# Patient Record
Sex: MALE | Race: WHITE | NOT HISPANIC OR LATINO | Employment: FULL TIME | URBAN - METROPOLITAN AREA
[De-identification: names, ages, dates, MRNs, and addresses within clinical notes are randomized per-mention and may not be internally consistent; named-entity substitution may affect disease eponyms.]

---

## 2023-06-14 ENCOUNTER — APPOINTMENT (OUTPATIENT)
Dept: PRIMARY CARE | Facility: CLINIC | Age: 48
End: 2023-06-14
Payer: COMMERCIAL

## 2023-06-28 ENCOUNTER — OFFICE VISIT (OUTPATIENT)
Dept: PRIMARY CARE | Facility: CLINIC | Age: 48
End: 2023-06-28
Payer: COMMERCIAL

## 2023-06-28 VITALS
RESPIRATION RATE: 18 BRPM | BODY MASS INDEX: 42.7 KG/M2 | HEIGHT: 71 IN | HEART RATE: 76 BPM | SYSTOLIC BLOOD PRESSURE: 138 MMHG | WEIGHT: 305 LBS | TEMPERATURE: 98.4 F | DIASTOLIC BLOOD PRESSURE: 90 MMHG

## 2023-06-28 DIAGNOSIS — I82.402 ACUTE THROMBOEMBOLISM OF DEEP VEINS OF LEFT LOWER EXTREMITY (MULTI): Primary | ICD-10-CM

## 2023-06-28 DIAGNOSIS — I10 BENIGN ESSENTIAL HTN: ICD-10-CM

## 2023-06-28 DIAGNOSIS — E66.01 OBESITY, CLASS III, BMI 40-49.9 (MORBID OBESITY) (MULTI): ICD-10-CM

## 2023-06-28 PROBLEM — E66.813 OBESITY, CLASS III, BMI 40-49.9 (MORBID OBESITY): Status: ACTIVE | Noted: 2023-06-28

## 2023-06-28 PROBLEM — I83.819 VARICOSE VEINS WITH PAIN: Status: ACTIVE | Noted: 2023-06-28

## 2023-06-28 PROCEDURE — 3075F SYST BP GE 130 - 139MM HG: CPT | Performed by: FAMILY MEDICINE

## 2023-06-28 PROCEDURE — 4004F PT TOBACCO SCREEN RCVD TLK: CPT | Performed by: FAMILY MEDICINE

## 2023-06-28 PROCEDURE — 99214 OFFICE O/P EST MOD 30 MIN: CPT | Performed by: FAMILY MEDICINE

## 2023-06-28 PROCEDURE — 3080F DIAST BP >= 90 MM HG: CPT | Performed by: FAMILY MEDICINE

## 2023-06-28 RX ORDER — APIXABAN 5 MG/1
5 TABLET, FILM COATED ORAL 2 TIMES DAILY
COMMUNITY
End: 2023-06-28 | Stop reason: SDUPTHER

## 2023-06-28 RX ORDER — LISINOPRIL AND HYDROCHLOROTHIAZIDE 12.5; 2 MG/1; MG/1
1 TABLET ORAL DAILY
COMMUNITY
Start: 2022-11-09 | End: 2023-06-28 | Stop reason: SDUPTHER

## 2023-06-28 RX ORDER — LISINOPRIL AND HYDROCHLOROTHIAZIDE 12.5; 2 MG/1; MG/1
1 TABLET ORAL DAILY
Qty: 90 TABLET | Refills: 3 | Status: SHIPPED | OUTPATIENT
Start: 2023-06-28 | End: 2024-06-27

## 2023-06-28 RX ORDER — APIXABAN 5 MG/1
5 TABLET, FILM COATED ORAL 2 TIMES DAILY
Qty: 180 TABLET | Refills: 3 | Status: SHIPPED | OUTPATIENT
Start: 2023-06-28 | End: 2024-06-27

## 2023-06-28 RX ORDER — SEMAGLUTIDE 0.25 MG/.5ML
0.25 INJECTION, SOLUTION SUBCUTANEOUS
Qty: 0.5 ML | Refills: 1 | Status: SHIPPED | OUTPATIENT
Start: 2023-06-28 | End: 2023-07-19

## 2023-06-28 ASSESSMENT — ENCOUNTER SYMPTOMS
SHORTNESS OF BREATH: 0
HEADACHES: 0
BLURRED VISION: 0
PALPITATIONS: 0
HYPERTENSION: 1

## 2023-06-28 NOTE — ASSESSMENT & PLAN NOTE
Obesity has been something he has struggled with for many years now.  He has been unable to make progress with diet and exercise and is actually gained weight over the years.  We discussed treatment options and I think that he would benefit greatly from weight loss help with his hypertension among other things.  We discussed the pros and cons of starting a medicine like Wegovy and he would like to proceed with this.  I will refer him to our in-house pharmacist to help with the titration and start the process.  He will follow-up in 3 months   coagulation(Bleeding disorder R/T clinical cond/anti-coags)

## 2023-06-28 NOTE — ASSESSMENT & PLAN NOTE
He reports good compliance with his lisinopril HCT medication.  Blood pressure reduced from a systolic of 149-138 after sitting quietly for a few moments in my office.  I do not think he needs an adjustment on his blood pressure medicine today.  We will continue the current treatment and follow-up in 3 months

## 2023-06-28 NOTE — PROGRESS NOTES
Subjective   Tien Prakash is a 48 y.o. male who presents for Hypertension.  Hypertension  This is a chronic problem. The problem is controlled. Pertinent negatives include no blurred vision, chest pain, headaches, palpitations, peripheral edema or shortness of breath. Past treatments include ACE inhibitors and diuretics. The current treatment provides significant improvement.     Visit Vitals  /90   Pulse 76   Temp 36.9 °C (98.4 °F)   Resp 18      Objective   Physical Exam  Constitutional:       Appearance: Normal appearance.   HENT:      Head: Normocephalic.   Pulmonary:      Effort: Pulmonary effort is normal.   Musculoskeletal:      Cervical back: Neck supple.      Right lower leg: No edema.      Left lower leg: No edema.   Skin:     General: Skin is warm and dry.   Psychiatric:         Mood and Affect: Mood normal.         Assessment/Plan    Problem List Items Addressed This Visit       Obesity, Class III, BMI 40-49.9 (morbid obesity) (CMS/HCC)     Obesity has been something he has struggled with for many years now.  He has been unable to make progress with diet and exercise and is actually gained weight over the years.  We discussed treatment options and I think that he would benefit greatly from weight loss help with his hypertension among other things.  We discussed the pros and cons of starting a medicine like Wegovy and he would like to proceed with this.  I will refer him to our in-house pharmacist to help with the titration and start the process.  He will follow-up in 3 months         Relevant Medications    semaglutide, weight loss, (Wegovy) 0.25 mg/0.5 mL pen injector    Other Relevant Orders    Follow Up In [facility] - Pharmacy    Follow Up In [facility] - PCP - Established    Benign essential HTN     He reports good compliance with his lisinopril HCT medication.  Blood pressure reduced from a systolic of 149-138 after sitting quietly for a few moments in my office.  I do  not think he needs an adjustment on his blood pressure medicine today.  We will continue the current treatment and follow-up in 3 months         Relevant Medications    lisinopriL-hydrochlorothiazide 20-12.5 mg tablet    Acute thromboembolism of deep veins of left lower extremity (CMS/HCC) - Primary     He will maintain long term anticoagulation with eliquis due to a large unprovoked DVT         Relevant Medications    Eliquis 5 mg tablet

## 2023-07-10 ENCOUNTER — APPOINTMENT (OUTPATIENT)
Dept: PHARMACY | Facility: HOSPITAL | Age: 48
End: 2023-07-10
Payer: COMMERCIAL

## 2023-07-18 ENCOUNTER — APPOINTMENT (OUTPATIENT)
Dept: PHARMACY | Facility: HOSPITAL | Age: 48
End: 2023-07-18
Payer: COMMERCIAL

## 2023-07-19 ENCOUNTER — TELEMEDICINE (OUTPATIENT)
Dept: PHARMACY | Facility: HOSPITAL | Age: 48
End: 2023-07-19
Payer: COMMERCIAL

## 2023-07-19 DIAGNOSIS — E66.01 OBESITY, CLASS III, BMI 40-49.9 (MORBID OBESITY) (MULTI): ICD-10-CM

## 2023-07-19 RX ORDER — SEMAGLUTIDE 0.68 MG/ML
0.25 INJECTION, SOLUTION SUBCUTANEOUS
Qty: 3 ML | Refills: 1 | Status: SHIPPED | OUTPATIENT
Start: 2023-07-19 | End: 2023-08-10

## 2023-07-19 NOTE — PROGRESS NOTES
"Subjective   Patient ID: Tien Prakash is a 48 y.o. male who presents for Obesity (Referred by PCP). Wegovy plan exclusion. Received test claim data from  pharmacy and updated pt. Discussed options available and discussed that each option will need a PA and may be denied d/t diagnosis of obesity.      There were no vitals taken for this visit.        Assessment/Plan   Problem List Items Addressed This Visit       Obesity, Class III, BMI 40-49.9 (morbid obesity) (CMS/[facility])    Relevant Medications    semaglutide (Ozempic) 0.25 mg or 0.5 mg (2 mg/3 mL) pen injector    Other Relevant Orders    Follow Up In [facility] - Pharmacy       LAB RESULTS:  No results found for: \"HGBA1C\"  Lab Results   Component Value Date    CREATININE 1.13 09/08/2022      Lab Results   Component Value Date    CHOL 203 (H) 09/08/2022     Lab Results   Component Value Date    HDL 39.0 (A) 09/08/2022       No results found for: \"LDLCALC\"  Lab Results   Component Value Date    TRIG 123 09/08/2022     Continue all meds under the continuation of care with the referring provider and clinical pharmacy team.    Rx sent for Ozempic to [facility] for PA assistance; to follow-up with pt in 3 weeks to update.     "

## 2023-08-09 ENCOUNTER — TELEMEDICINE (OUTPATIENT)
Dept: PHARMACY | Facility: HOSPITAL | Age: 48
End: 2023-08-09
Payer: COMMERCIAL

## 2023-08-09 DIAGNOSIS — E66.01 OBESITY, CLASS III, BMI 40-49.9 (MORBID OBESITY) (MULTI): ICD-10-CM

## 2023-08-09 NOTE — PROGRESS NOTES
"Subjective   Patient ID: Tien Prakash is a 48 y.o. male who presents for Obesity (Referred by PCP. ). Confrimed with [facility] that all obesity related GLP-1RA's are not covered under patient's plan. Ozempic for example needs a DMII diagnosis to be considered for coverage. Pt reports he may be joining a weight loss clinic that supplies semaglutide injections without a Rx in the near future       There were no vitals taken for this visit.        Assessment/Plan   Problem List Items Addressed This Visit       Obesity, Class III, BMI 40-49.9 (morbid obesity) (CMS/[facility])       LAB RESULTS:  No results found for: \"HGBA1C\"  Lab Results   Component Value Date    CREATININE 1.13 09/08/2022      Lab Results   Component Value Date    CHOL 203 (H) 09/08/2022     Lab Results   Component Value Date    HDL 39.0 (A) 09/08/2022       No results found for: \"LDLCALC\"  Lab Results   Component Value Date    TRIG 123 09/08/2022       Continue all meds under the continuation of care with the referring provider and clinical pharmacy team.    -Closing referral as no GLP-1RA's are covered for obesity under ins. Plan.         "

## 2023-08-09 NOTE — Clinical Note
Pt. Mentioned joining a weight loss clinic that supplies semaglutide injections (through syringe). Unable to manage this but wanted you to be aware as pt may be losing weight without otherwise known cause.

## 2023-09-29 ENCOUNTER — LAB (OUTPATIENT)
Dept: LAB | Facility: LAB | Age: 48
End: 2023-09-29
Payer: COMMERCIAL

## 2023-09-29 ENCOUNTER — OFFICE VISIT (OUTPATIENT)
Dept: PRIMARY CARE | Facility: CLINIC | Age: 48
End: 2023-09-29
Payer: COMMERCIAL

## 2023-09-29 VITALS
HEIGHT: 71 IN | WEIGHT: 292 LBS | HEART RATE: 86 BPM | OXYGEN SATURATION: 95 % | BODY MASS INDEX: 40.88 KG/M2 | RESPIRATION RATE: 16 BRPM | DIASTOLIC BLOOD PRESSURE: 86 MMHG | TEMPERATURE: 96.1 F | SYSTOLIC BLOOD PRESSURE: 125 MMHG

## 2023-09-29 DIAGNOSIS — Z00.00 ROUTINE GENERAL MEDICAL EXAMINATION AT A HEALTH CARE FACILITY: ICD-10-CM

## 2023-09-29 DIAGNOSIS — Z23 NEED FOR INFLUENZA VACCINATION: ICD-10-CM

## 2023-09-29 DIAGNOSIS — I10 BENIGN ESSENTIAL HTN: Primary | ICD-10-CM

## 2023-09-29 DIAGNOSIS — E66.01 OBESITY, CLASS III, BMI 40-49.9 (MORBID OBESITY) (MULTI): ICD-10-CM

## 2023-09-29 DIAGNOSIS — I82.402 ACUTE THROMBOEMBOLISM OF DEEP VEINS OF LEFT LOWER EXTREMITY (MULTI): ICD-10-CM

## 2023-09-29 DIAGNOSIS — I10 BENIGN ESSENTIAL HTN: ICD-10-CM

## 2023-09-29 LAB
ALANINE AMINOTRANSFERASE (SGPT) (U/L) IN SER/PLAS: 26 U/L (ref 10–52)
ALBUMIN (G/DL) IN SER/PLAS: 4.4 G/DL (ref 3.4–5)
ALKALINE PHOSPHATASE (U/L) IN SER/PLAS: 63 U/L (ref 33–120)
ANION GAP IN SER/PLAS: 11 MMOL/L (ref 10–20)
ASPARTATE AMINOTRANSFERASE (SGOT) (U/L) IN SER/PLAS: 15 U/L (ref 9–39)
BILIRUBIN TOTAL (MG/DL) IN SER/PLAS: 0.6 MG/DL (ref 0–1.2)
CALCIUM (MG/DL) IN SER/PLAS: 9.4 MG/DL (ref 8.6–10.3)
CARBON DIOXIDE, TOTAL (MMOL/L) IN SER/PLAS: 28 MMOL/L (ref 21–32)
CHLORIDE (MMOL/L) IN SER/PLAS: 103 MMOL/L (ref 98–107)
CHOLESTEROL (MG/DL) IN SER/PLAS: 177 MG/DL (ref 0–199)
CHOLESTEROL IN HDL (MG/DL) IN SER/PLAS: 37.8 MG/DL
CHOLESTEROL/HDL RATIO: 4.7
CREATININE (MG/DL) IN SER/PLAS: 1.16 MG/DL (ref 0.5–1.3)
ERYTHROCYTE DISTRIBUTION WIDTH (RATIO) BY AUTOMATED COUNT: 13.2 % (ref 11.5–14.5)
ERYTHROCYTE MEAN CORPUSCULAR HEMOGLOBIN CONCENTRATION (G/DL) BY AUTOMATED: 31.7 G/DL (ref 32–36)
ERYTHROCYTE MEAN CORPUSCULAR VOLUME (FL) BY AUTOMATED COUNT: 91 FL (ref 80–100)
ERYTHROCYTES (10*6/UL) IN BLOOD BY AUTOMATED COUNT: 5.95 X10E12/L (ref 4.5–5.9)
GFR MALE: 77 ML/MIN/1.73M2
GLUCOSE (MG/DL) IN SER/PLAS: 83 MG/DL (ref 74–99)
HEMATOCRIT (%) IN BLOOD BY AUTOMATED COUNT: 54 % (ref 41–52)
HEMOGLOBIN (G/DL) IN BLOOD: 17.1 G/DL (ref 13.5–17.5)
HEPATITIS C VIRUS AB PRESENCE IN SERUM: NONREACTIVE
LDL: 119 MG/DL (ref 0–99)
LEUKOCYTES (10*3/UL) IN BLOOD BY AUTOMATED COUNT: 11 X10E9/L (ref 4.4–11.3)
PLATELETS (10*3/UL) IN BLOOD AUTOMATED COUNT: 297 X10E9/L (ref 150–450)
POTASSIUM (MMOL/L) IN SER/PLAS: 4.4 MMOL/L (ref 3.5–5.3)
PROTEIN TOTAL: 7.1 G/DL (ref 6.4–8.2)
SODIUM (MMOL/L) IN SER/PLAS: 138 MMOL/L (ref 136–145)
TRIGLYCERIDE (MG/DL) IN SER/PLAS: 99 MG/DL (ref 0–149)
UREA NITROGEN (MG/DL) IN SER/PLAS: 19 MG/DL (ref 6–23)
VLDL: 20 MG/DL (ref 0–40)

## 2023-09-29 PROCEDURE — 80061 LIPID PANEL: CPT

## 2023-09-29 PROCEDURE — 90471 IMMUNIZATION ADMIN: CPT | Performed by: FAMILY MEDICINE

## 2023-09-29 PROCEDURE — 86803 HEPATITIS C AB TEST: CPT

## 2023-09-29 PROCEDURE — 99214 OFFICE O/P EST MOD 30 MIN: CPT | Performed by: FAMILY MEDICINE

## 2023-09-29 PROCEDURE — 3074F SYST BP LT 130 MM HG: CPT | Performed by: FAMILY MEDICINE

## 2023-09-29 PROCEDURE — 85027 COMPLETE CBC AUTOMATED: CPT

## 2023-09-29 PROCEDURE — 80053 COMPREHEN METABOLIC PANEL: CPT

## 2023-09-29 PROCEDURE — 90686 IIV4 VACC NO PRSV 0.5 ML IM: CPT | Performed by: FAMILY MEDICINE

## 2023-09-29 PROCEDURE — 3079F DIAST BP 80-89 MM HG: CPT | Performed by: FAMILY MEDICINE

## 2023-09-29 PROCEDURE — 4004F PT TOBACCO SCREEN RCVD TLK: CPT | Performed by: FAMILY MEDICINE

## 2023-09-29 PROCEDURE — 36415 COLL VENOUS BLD VENIPUNCTURE: CPT

## 2023-09-29 ASSESSMENT — ENCOUNTER SYMPTOMS
PALPITATIONS: 0
HYPERTENSION: 1
BLURRED VISION: 0
HEADACHES: 0

## 2023-09-29 NOTE — PROGRESS NOTES
Subjective   Tien Prakash is a 48 y.o. male who presents for Hypertension and Weight Loss.    Hypertension  This is a chronic problem. The problem is controlled. Pertinent negatives include no blurred vision, chest pain, headaches or palpitations.     Visit Vitals  /86 (BP Location: Right arm, Patient Position: Sitting)   Pulse 86   Temp 35.6 °C (96.1 °F)   Resp 16      Objective   Physical Exam  Constitutional:       Appearance: Normal appearance.   HENT:      Head: Normocephalic.   Eyes:      Conjunctiva/sclera: Conjunctivae normal.   Cardiovascular:      Rate and Rhythm: Normal rate and regular rhythm.   Pulmonary:      Effort: Pulmonary effort is normal.      Breath sounds: Normal breath sounds.   Musculoskeletal:      Cervical back: Neck supple.   Skin:     General: Skin is warm and dry.   Neurological:      Mental Status: He is alert.         Assessment/Plan    Problem List Items Addressed This Visit       Obesity, Class III, BMI 40-49.9 (morbid obesity) (CMS/[facility])     Unfortunately he was unable to get insurance coverage for Wegovy.  He ultimately went to a weight loss center and was initiated on semaglutide which she was able to get for just $300 a month.  He has had a 10 pound weight loss already and is tolerating his titration well.  I encouraged him to continue with this treatment and we will get his annual routine labs.  Since his lipid panel was mildly elevated and his ASCVD risk score was 10.1% last year we will repeat the numbers this year in the hopes that this diet and weight loss have improved his score         Relevant Orders    CBC    Comprehensive Metabolic Panel    Lipid Panel    Follow Up In [facility] - PCP - Health Maintenance    Benign essential HTN - Primary     This remains under good control with a blood pressure systolic 125.  We will continue the lisinopril and thiazide diuretic and get annual blood work         Relevant Orders    CBC    Comprehensive Metabolic Panel     Lipid Panel    Acute thromboembolism of deep veins of left lower extremity (CMS/HCC)     He will maintain long term anticoagulation with eliquis due to a large unprovoked DVT          Other Visit Diagnoses       Need for influenza vaccination        Relevant Orders    Flu vaccine (IIV4) age 6 months and greater, preservative free (Completed)    Routine general medical examination at a health care facility        Relevant Orders    Hepatitis C Antibody

## 2023-09-29 NOTE — ASSESSMENT & PLAN NOTE
This remains under good control with a blood pressure systolic 125.  We will continue the lisinopril and thiazide diuretic and get annual blood work

## 2023-09-29 NOTE — ASSESSMENT & PLAN NOTE
Unfortunately he was unable to get insurance coverage for Wegovy.  He ultimately went to a weight loss center and was initiated on semaglutide which she was able to get for just $300 a month.  He has had a 10 pound weight loss already and is tolerating his titration well.  I encouraged him to continue with this treatment and we will get his annual routine labs.  Since his lipid panel was mildly elevated and his ASCVD risk score was 10.1% last year we will repeat the numbers this year in the hopes that this diet and weight loss have improved his score

## 2024-07-06 DIAGNOSIS — I10 BENIGN ESSENTIAL HTN: ICD-10-CM

## 2024-07-08 RX ORDER — LISINOPRIL AND HYDROCHLOROTHIAZIDE 12.5; 2 MG/1; MG/1
1 TABLET ORAL DAILY
Qty: 90 TABLET | Refills: 3 | Status: SHIPPED | OUTPATIENT
Start: 2024-07-08

## 2024-09-30 ENCOUNTER — APPOINTMENT (OUTPATIENT)
Dept: PRIMARY CARE | Facility: CLINIC | Age: 49
End: 2024-09-30
Payer: COMMERCIAL

## 2024-09-30 ENCOUNTER — LAB (OUTPATIENT)
Dept: LAB | Facility: LAB | Age: 49
End: 2024-09-30
Payer: COMMERCIAL

## 2024-09-30 VITALS
TEMPERATURE: 97.2 F | SYSTOLIC BLOOD PRESSURE: 131 MMHG | RESPIRATION RATE: 18 BRPM | WEIGHT: 235 LBS | DIASTOLIC BLOOD PRESSURE: 84 MMHG | HEART RATE: 78 BPM | HEIGHT: 71 IN | OXYGEN SATURATION: 98 % | BODY MASS INDEX: 32.9 KG/M2

## 2024-09-30 DIAGNOSIS — I10 BENIGN ESSENTIAL HTN: ICD-10-CM

## 2024-09-30 DIAGNOSIS — E66.811 CLASS 1 OBESITY DUE TO EXCESS CALORIES WITH SERIOUS COMORBIDITY AND BODY MASS INDEX (BMI) OF 32.0 TO 32.9 IN ADULT: ICD-10-CM

## 2024-09-30 DIAGNOSIS — Z00.00 ROUTINE GENERAL MEDICAL EXAMINATION AT A HEALTH CARE FACILITY: ICD-10-CM

## 2024-09-30 DIAGNOSIS — Z11.4 ENCOUNTER FOR SCREENING FOR HIV: ICD-10-CM

## 2024-09-30 DIAGNOSIS — Z23 NEED FOR INFLUENZA VACCINATION: ICD-10-CM

## 2024-09-30 DIAGNOSIS — L98.9 LESION OF SKIN OF NOSE: ICD-10-CM

## 2024-09-30 DIAGNOSIS — Z00.00 ROUTINE GENERAL MEDICAL EXAMINATION AT A HEALTH CARE FACILITY: Primary | ICD-10-CM

## 2024-09-30 DIAGNOSIS — E66.09 CLASS 1 OBESITY DUE TO EXCESS CALORIES WITH SERIOUS COMORBIDITY AND BODY MASS INDEX (BMI) OF 32.0 TO 32.9 IN ADULT: ICD-10-CM

## 2024-09-30 DIAGNOSIS — Z86.718 HX OF DEEP VENOUS THROMBOSIS: ICD-10-CM

## 2024-09-30 LAB
ALBUMIN SERPL BCP-MCNC: 4.6 G/DL (ref 3.4–5)
ALP SERPL-CCNC: 61 U/L (ref 33–120)
ALT SERPL W P-5'-P-CCNC: 18 U/L (ref 10–52)
ANION GAP SERPL CALC-SCNC: 11 MMOL/L (ref 10–20)
AST SERPL W P-5'-P-CCNC: 13 U/L (ref 9–39)
BILIRUB SERPL-MCNC: 0.8 MG/DL (ref 0–1.2)
BUN SERPL-MCNC: 13 MG/DL (ref 6–23)
CALCIUM SERPL-MCNC: 9.9 MG/DL (ref 8.6–10.3)
CHLORIDE SERPL-SCNC: 105 MMOL/L (ref 98–107)
CHOLEST SERPL-MCNC: 182 MG/DL (ref 0–199)
CHOLESTEROL/HDL RATIO: 3.8
CO2 SERPL-SCNC: 27 MMOL/L (ref 21–32)
CREAT SERPL-MCNC: 1.18 MG/DL (ref 0.5–1.3)
EGFRCR SERPLBLD CKD-EPI 2021: 76 ML/MIN/1.73M*2
ERYTHROCYTE [DISTWIDTH] IN BLOOD BY AUTOMATED COUNT: 12.8 % (ref 11.5–14.5)
GLUCOSE SERPL-MCNC: 85 MG/DL (ref 74–99)
HCT VFR BLD AUTO: 52.9 % (ref 41–52)
HDLC SERPL-MCNC: 48.2 MG/DL
HGB BLD-MCNC: 17.1 G/DL (ref 13.5–17.5)
HIV 1+2 AB+HIV1 P24 AG SERPL QL IA: NONREACTIVE
LDLC SERPL CALC-MCNC: 113 MG/DL
MCH RBC QN AUTO: 29.2 PG (ref 26–34)
MCHC RBC AUTO-ENTMCNC: 32.3 G/DL (ref 32–36)
MCV RBC AUTO: 90 FL (ref 80–100)
NON HDL CHOLESTEROL: 134 MG/DL (ref 0–149)
NRBC BLD-RTO: 0 /100 WBCS (ref 0–0)
PLATELET # BLD AUTO: 287 X10*3/UL (ref 150–450)
POTASSIUM SERPL-SCNC: 4.7 MMOL/L (ref 3.5–5.3)
PROT SERPL-MCNC: 7.2 G/DL (ref 6.4–8.2)
RBC # BLD AUTO: 5.86 X10*6/UL (ref 4.5–5.9)
SODIUM SERPL-SCNC: 138 MMOL/L (ref 136–145)
TRIGL SERPL-MCNC: 103 MG/DL (ref 0–149)
VLDL: 21 MG/DL (ref 0–40)
WBC # BLD AUTO: 11.3 X10*3/UL (ref 4.4–11.3)

## 2024-09-30 PROCEDURE — 80053 COMPREHEN METABOLIC PANEL: CPT

## 2024-09-30 PROCEDURE — 87389 HIV-1 AG W/HIV-1&-2 AB AG IA: CPT

## 2024-09-30 PROCEDURE — 85027 COMPLETE CBC AUTOMATED: CPT

## 2024-09-30 PROCEDURE — 99396 PREV VISIT EST AGE 40-64: CPT | Performed by: FAMILY MEDICINE

## 2024-09-30 PROCEDURE — 90656 IIV3 VACC NO PRSV 0.5 ML IM: CPT | Performed by: FAMILY MEDICINE

## 2024-09-30 PROCEDURE — 90471 IMMUNIZATION ADMIN: CPT | Performed by: FAMILY MEDICINE

## 2024-09-30 PROCEDURE — 3075F SYST BP GE 130 - 139MM HG: CPT | Performed by: FAMILY MEDICINE

## 2024-09-30 PROCEDURE — 1036F TOBACCO NON-USER: CPT | Performed by: FAMILY MEDICINE

## 2024-09-30 PROCEDURE — 36415 COLL VENOUS BLD VENIPUNCTURE: CPT

## 2024-09-30 PROCEDURE — 3008F BODY MASS INDEX DOCD: CPT | Performed by: FAMILY MEDICINE

## 2024-09-30 PROCEDURE — 80061 LIPID PANEL: CPT

## 2024-09-30 PROCEDURE — 3079F DIAST BP 80-89 MM HG: CPT | Performed by: FAMILY MEDICINE

## 2024-09-30 RX ORDER — LISINOPRIL AND HYDROCHLOROTHIAZIDE 12.5; 2 MG/1; MG/1
1 TABLET ORAL DAILY
Qty: 90 TABLET | Refills: 3 | Status: SHIPPED | OUTPATIENT
Start: 2024-09-30

## 2024-09-30 RX ORDER — APIXABAN 5 MG/1
5 TABLET, FILM COATED ORAL 2 TIMES DAILY
Qty: 180 TABLET | Refills: 3 | Status: SHIPPED | OUTPATIENT
Start: 2024-09-30 | End: 2025-09-30

## 2024-09-30 NOTE — ASSESSMENT & PLAN NOTE
Orders:    Follow Up In [facility] - PCP - Health Maintenance    CBC; Future    Comprehensive Metabolic Panel; Future    Lipid Panel; Future

## 2024-09-30 NOTE — ASSESSMENT & PLAN NOTE
We will continue his lifelong prophylaxis with Eliquis.  The severe venous varicosities may have predisposed him but are currently asymptomatic.  Orders:    Eliquis 5 mg tablet; Take 1 tablet (5 mg) by mouth 2 times a day.

## 2024-09-30 NOTE — PROGRESS NOTES
Subjective   Tien Prakash is a 49 y.o. male who presents for Annual Exam.     HPI         Visit Vitals  /84   Pulse 78   Temp 36.2 °C (97.2 °F)   Resp 18      Objective   Physical Exam  Constitutional:       Appearance: Normal appearance.   HENT:      Head: Normocephalic.   Eyes:      Conjunctiva/sclera: Conjunctivae normal.   Cardiovascular:      Rate and Rhythm: Normal rate and regular rhythm.      Heart sounds: Normal heart sounds.      Comments: Large varicose veins both legs  Pulmonary:      Effort: Pulmonary effort is normal.      Breath sounds: Normal breath sounds.   Musculoskeletal:      Cervical back: Neck supple.   Skin:     General: Skin is warm and dry.      Comments: There is a 1 mm rough slightly scabbed papule on the left nare of the nose.  There is a 3 mm fleshy skin tag on the left medial thigh   Neurological:      Mental Status: He is alert.            Assessment & Plan  Routine general medical examination at a health care facility    Orders:    CBC; Future    Comprehensive Metabolic Panel; Future    Lipid Panel; Future    Class 1 obesity due to excess calories with serious comorbidity and body mass index (BMI) of 32.0 to 32.9 in adult  There has been an enormous amount of weight loss over the last year due to starting semaglutide.  He will continue to get this through a compounding pharmacy supplier as his body mass index is come down from above 40 to 32  Orders:    Follow Up In [facility] - PCP - Health Maintenance    CBC; Future    Comprehensive Metabolic Panel; Future    Lipid Panel; Future    Encounter for screening for HIV    Orders:    HIV 1/2 Antigen/Antibody Screen with Reflex to Confirmation; Future    Need for influenza vaccination    Orders:    Flu vaccine, trivalent, preservative free, age 6 months and greater (Fluraix/Fluzone/Flulaval)    Benign essential HTN  This remains under excellent control.  We will continue his combination ACE inhibitor thiazide  diuretic  Orders:    lisinopriL-hydrochlorothiazide 20-12.5 mg tablet; Take 1 tablet by mouth once daily.    Lesion of skin of nose  This is a 1 mm slightly scabbed pearly papule of the left nare of the nostril.  It is mildly suspicious for a basal cell carcinoma.  I am referring him to dermatology for further evaluation and treatment  Orders:    Referral to Dermatology    Hx of deep venous thrombosis  We will continue his lifelong prophylaxis with Eliquis.  The severe venous varicosities may have predisposed him but are currently asymptomatic.  Orders:    Eliquis 5 mg tablet; Take 1 tablet (5 mg) by mouth 2 times a day.           Please excuse any errors in grammar or translation related to this dictation. Voice recognition software was utilized to prepare this document.

## 2024-09-30 NOTE — ASSESSMENT & PLAN NOTE
There has been an enormous amount of weight loss over the last year due to starting semaglutide.  He will continue to get this through a compounding pharmacy supplier as his body mass index is come down from above 40 to 32  Orders:    Follow Up In [facility] - PCP - Health Maintenance    CBC; Future    Comprehensive Metabolic Panel; Future    Lipid Panel; Future

## 2025-07-14 ENCOUNTER — APPOINTMENT (OUTPATIENT)
Dept: RADIOLOGY | Facility: HOSPITAL | Age: 50
DRG: 394 | End: 2025-07-14
Payer: COMMERCIAL

## 2025-07-14 ENCOUNTER — HOSPITAL ENCOUNTER (INPATIENT)
Facility: HOSPITAL | Age: 50
LOS: 2 days | Discharge: HOME | DRG: 394 | End: 2025-07-16
Attending: STUDENT IN AN ORGANIZED HEALTH CARE EDUCATION/TRAINING PROGRAM | Admitting: STUDENT IN AN ORGANIZED HEALTH CARE EDUCATION/TRAINING PROGRAM
Payer: COMMERCIAL

## 2025-07-14 ENCOUNTER — APPOINTMENT (OUTPATIENT)
Dept: CARDIOLOGY | Facility: HOSPITAL | Age: 50
DRG: 394 | End: 2025-07-14
Payer: COMMERCIAL

## 2025-07-14 DIAGNOSIS — Z86.718 HX OF DEEP VENOUS THROMBOSIS: ICD-10-CM

## 2025-07-14 DIAGNOSIS — I82.290 ACUTE EMBOLISM AND THROMBOSIS OF OTHER THORACIC VEINS: ICD-10-CM

## 2025-07-14 DIAGNOSIS — I10 HTN (HYPERTENSION), BENIGN: ICD-10-CM

## 2025-07-14 DIAGNOSIS — N17.9 ACUTE KIDNEY INJURY: ICD-10-CM

## 2025-07-14 DIAGNOSIS — D72.829 LEUKOCYTOSIS, UNSPECIFIED TYPE: ICD-10-CM

## 2025-07-14 DIAGNOSIS — K55.019: Primary | ICD-10-CM

## 2025-07-14 DIAGNOSIS — E87.1 HYPONATREMIA: ICD-10-CM

## 2025-07-14 DIAGNOSIS — K52.9 COLITIS: ICD-10-CM

## 2025-07-14 LAB
ALBUMIN SERPL BCP-MCNC: 3.9 G/DL (ref 3.4–5)
ALP SERPL-CCNC: 84 U/L (ref 33–120)
ALT SERPL W P-5'-P-CCNC: 26 U/L (ref 10–52)
ANION GAP SERPL CALC-SCNC: 17 MMOL/L (ref 10–20)
APPEARANCE UR: ABNORMAL
AST SERPL W P-5'-P-CCNC: 12 U/L (ref 9–39)
BASOPHILS # BLD AUTO: 0.09 X10*3/UL (ref 0–0.1)
BASOPHILS NFR BLD AUTO: 0.3 %
BILIRUB SERPL-MCNC: 1.8 MG/DL (ref 0–1.2)
BILIRUB UR STRIP.AUTO-MCNC: NEGATIVE MG/DL
BUN SERPL-MCNC: 43 MG/DL (ref 6–23)
CALCIUM SERPL-MCNC: 9 MG/DL (ref 8.6–10.3)
CARDIAC TROPONIN I PNL SERPL HS: 7 NG/L (ref 0–20)
CHLORIDE SERPL-SCNC: 96 MMOL/L (ref 98–107)
CO2 SERPL-SCNC: 18 MMOL/L (ref 21–32)
COLOR UR: YELLOW
CREAT SERPL-MCNC: 2.16 MG/DL (ref 0.5–1.3)
EGFRCR SERPLBLD CKD-EPI 2021: 36 ML/MIN/1.73M*2
EOSINOPHIL # BLD AUTO: 0.05 X10*3/UL (ref 0–0.7)
EOSINOPHIL NFR BLD AUTO: 0.2 %
ERYTHROCYTE [DISTWIDTH] IN BLOOD BY AUTOMATED COUNT: 13.1 % (ref 11.5–14.5)
GLUCOSE SERPL-MCNC: 123 MG/DL (ref 74–99)
GLUCOSE UR STRIP.AUTO-MCNC: NORMAL MG/DL
HCT VFR BLD AUTO: 47.2 % (ref 41–52)
HGB BLD-MCNC: 16 G/DL (ref 13.5–17.5)
IMM GRANULOCYTES # BLD AUTO: 0.5 X10*3/UL (ref 0–0.7)
IMM GRANULOCYTES NFR BLD AUTO: 1.7 % (ref 0–0.9)
KETONES UR STRIP.AUTO-MCNC: ABNORMAL MG/DL
LACTATE SERPL-SCNC: 1.7 MMOL/L (ref 0.4–2)
LEUKOCYTE ESTERASE UR QL STRIP.AUTO: NEGATIVE
LIPASE SERPL-CCNC: 10 U/L (ref 9–82)
LYMPHOCYTES # BLD AUTO: 1.46 X10*3/UL (ref 1.2–4.8)
LYMPHOCYTES NFR BLD AUTO: 5.1 %
MCH RBC QN AUTO: 28.7 PG (ref 26–34)
MCHC RBC AUTO-ENTMCNC: 33.9 G/DL (ref 32–36)
MCV RBC AUTO: 85 FL (ref 80–100)
MONOCYTES # BLD AUTO: 2.23 X10*3/UL (ref 0.1–1)
MONOCYTES NFR BLD AUTO: 7.8 %
MUCOUS THREADS #/AREA URNS AUTO: ABNORMAL /LPF
NEUTROPHILS # BLD AUTO: 24.43 X10*3/UL (ref 1.2–7.7)
NEUTROPHILS NFR BLD AUTO: 84.9 %
NITRITE UR QL STRIP.AUTO: NEGATIVE
NRBC BLD-RTO: 0 /100 WBCS (ref 0–0)
PH UR STRIP.AUTO: 5.5 [PH]
PLATELET # BLD AUTO: 372 X10*3/UL (ref 150–450)
POTASSIUM SERPL-SCNC: 3.7 MMOL/L (ref 3.5–5.3)
PROT SERPL-MCNC: 7.7 G/DL (ref 6.4–8.2)
PROT UR STRIP.AUTO-MCNC: ABNORMAL MG/DL
RBC # BLD AUTO: 5.58 X10*6/UL (ref 4.5–5.9)
RBC # UR STRIP.AUTO: NEGATIVE MG/DL
RBC #/AREA URNS AUTO: ABNORMAL /HPF
SODIUM SERPL-SCNC: 127 MMOL/L (ref 136–145)
SP GR UR STRIP.AUTO: 1.05
UFH PPP CHRO-ACNC: 0.5 IU/ML (ref ?–1.1)
UROBILINOGEN UR STRIP.AUTO-MCNC: ABNORMAL MG/DL
WBC # BLD AUTO: 28.8 X10*3/UL (ref 4.4–11.3)
WBC #/AREA URNS AUTO: ABNORMAL /HPF

## 2025-07-14 PROCEDURE — 84484 ASSAY OF TROPONIN QUANT: CPT | Performed by: PHYSICIAN ASSISTANT

## 2025-07-14 PROCEDURE — 96361 HYDRATE IV INFUSION ADD-ON: CPT

## 2025-07-14 PROCEDURE — 87040 BLOOD CULTURE FOR BACTERIA: CPT | Mod: ELYLAB | Performed by: PHYSICIAN ASSISTANT

## 2025-07-14 PROCEDURE — 2500000004 HC RX 250 GENERAL PHARMACY W/ HCPCS (ALT 636 FOR OP/ED): Performed by: PHYSICIAN ASSISTANT

## 2025-07-14 PROCEDURE — 83605 ASSAY OF LACTIC ACID: CPT | Performed by: PHYSICIAN ASSISTANT

## 2025-07-14 PROCEDURE — 85025 COMPLETE CBC W/AUTO DIFF WBC: CPT | Performed by: PHYSICIAN ASSISTANT

## 2025-07-14 PROCEDURE — 99222 1ST HOSP IP/OBS MODERATE 55: CPT | Performed by: NURSE PRACTITIONER

## 2025-07-14 PROCEDURE — 74177 CT ABD & PELVIS W/CONTRAST: CPT

## 2025-07-14 PROCEDURE — 1200000002 HC GENERAL ROOM WITH TELEMETRY DAILY

## 2025-07-14 PROCEDURE — 99291 CRITICAL CARE FIRST HOUR: CPT | Mod: 25 | Performed by: PHYSICIAN ASSISTANT

## 2025-07-14 PROCEDURE — 99285 EMERGENCY DEPT VISIT HI MDM: CPT | Mod: 25 | Performed by: STUDENT IN AN ORGANIZED HEALTH CARE EDUCATION/TRAINING PROGRAM

## 2025-07-14 PROCEDURE — 74177 CT ABD & PELVIS W/CONTRAST: CPT | Performed by: STUDENT IN AN ORGANIZED HEALTH CARE EDUCATION/TRAINING PROGRAM

## 2025-07-14 PROCEDURE — 96376 TX/PRO/DX INJ SAME DRUG ADON: CPT

## 2025-07-14 PROCEDURE — 81001 URINALYSIS AUTO W/SCOPE: CPT | Performed by: PHYSICIAN ASSISTANT

## 2025-07-14 PROCEDURE — 2500000004 HC RX 250 GENERAL PHARMACY W/ HCPCS (ALT 636 FOR OP/ED): Performed by: NURSE PRACTITIONER

## 2025-07-14 PROCEDURE — 83935 ASSAY OF URINE OSMOLALITY: CPT | Mod: ELYLAB | Performed by: STUDENT IN AN ORGANIZED HEALTH CARE EDUCATION/TRAINING PROGRAM

## 2025-07-14 PROCEDURE — 96365 THER/PROPH/DIAG IV INF INIT: CPT

## 2025-07-14 PROCEDURE — 85520 HEPARIN ASSAY: CPT | Performed by: STUDENT IN AN ORGANIZED HEALTH CARE EDUCATION/TRAINING PROGRAM

## 2025-07-14 PROCEDURE — 84300 ASSAY OF URINE SODIUM: CPT | Performed by: STUDENT IN AN ORGANIZED HEALTH CARE EDUCATION/TRAINING PROGRAM

## 2025-07-14 PROCEDURE — 96375 TX/PRO/DX INJ NEW DRUG ADDON: CPT

## 2025-07-14 PROCEDURE — 84075 ASSAY ALKALINE PHOSPHATASE: CPT | Performed by: PHYSICIAN ASSISTANT

## 2025-07-14 PROCEDURE — 93005 ELECTROCARDIOGRAM TRACING: CPT

## 2025-07-14 PROCEDURE — 36415 COLL VENOUS BLD VENIPUNCTURE: CPT | Performed by: PHYSICIAN ASSISTANT

## 2025-07-14 PROCEDURE — 2550000001 HC RX 255 CONTRASTS: Performed by: STUDENT IN AN ORGANIZED HEALTH CARE EDUCATION/TRAINING PROGRAM

## 2025-07-14 PROCEDURE — 83690 ASSAY OF LIPASE: CPT | Performed by: PHYSICIAN ASSISTANT

## 2025-07-14 RX ORDER — HEPARIN SODIUM 10000 [USP'U]/100ML
0-4500 INJECTION, SOLUTION INTRAVENOUS CONTINUOUS
Status: DISCONTINUED | OUTPATIENT
Start: 2025-07-14 | End: 2025-07-16

## 2025-07-14 RX ORDER — ACETAMINOPHEN 500 MG
10 TABLET ORAL NIGHTLY PRN
Status: DISCONTINUED | OUTPATIENT
Start: 2025-07-14 | End: 2025-07-16 | Stop reason: HOSPADM

## 2025-07-14 RX ORDER — ACETAMINOPHEN 325 MG/1
650 TABLET ORAL EVERY 4 HOURS PRN
Status: DISCONTINUED | OUTPATIENT
Start: 2025-07-14 | End: 2025-07-16 | Stop reason: HOSPADM

## 2025-07-14 RX ORDER — FENTANYL CITRATE 50 UG/ML
50 INJECTION, SOLUTION INTRAMUSCULAR; INTRAVENOUS ONCE
Status: COMPLETED | OUTPATIENT
Start: 2025-07-14 | End: 2025-07-14

## 2025-07-14 RX ORDER — ACETAMINOPHEN 160 MG/5ML
650 SOLUTION ORAL EVERY 4 HOURS PRN
Status: DISCONTINUED | OUTPATIENT
Start: 2025-07-14 | End: 2025-07-16 | Stop reason: HOSPADM

## 2025-07-14 RX ORDER — ACETAMINOPHEN 650 MG/1
650 SUPPOSITORY RECTAL EVERY 4 HOURS PRN
Status: DISCONTINUED | OUTPATIENT
Start: 2025-07-14 | End: 2025-07-16 | Stop reason: HOSPADM

## 2025-07-14 RX ORDER — POLYETHYLENE GLYCOL 3350 17 G/17G
17 POWDER, FOR SOLUTION ORAL DAILY PRN
Status: DISCONTINUED | OUTPATIENT
Start: 2025-07-14 | End: 2025-07-16 | Stop reason: HOSPADM

## 2025-07-14 RX ORDER — HYDROMORPHONE HYDROCHLORIDE 0.2 MG/ML
0.2 INJECTION INTRAMUSCULAR; INTRAVENOUS; SUBCUTANEOUS
Status: DISCONTINUED | OUTPATIENT
Start: 2025-07-14 | End: 2025-07-16 | Stop reason: HOSPADM

## 2025-07-14 RX ORDER — SODIUM CHLORIDE 9 MG/ML
125 INJECTION, SOLUTION INTRAVENOUS CONTINUOUS
Status: ACTIVE | OUTPATIENT
Start: 2025-07-14 | End: 2025-07-15

## 2025-07-14 RX ADMIN — FENTANYL CITRATE 50 MCG: 50 INJECTION INTRAMUSCULAR; INTRAVENOUS at 20:36

## 2025-07-14 RX ADMIN — HEPARIN SODIUM 2000 UNITS/HR: 10000 INJECTION, SOLUTION INTRAVENOUS at 21:11

## 2025-07-14 RX ADMIN — IOHEXOL 75 ML: 350 INJECTION, SOLUTION INTRAVENOUS at 18:58

## 2025-07-14 RX ADMIN — FENTANYL CITRATE 50 MCG: 50 INJECTION INTRAMUSCULAR; INTRAVENOUS at 17:57

## 2025-07-14 RX ADMIN — SODIUM CHLORIDE 2250 ML: 0.9 INJECTION, SOLUTION INTRAVENOUS at 18:28

## 2025-07-14 RX ADMIN — SODIUM CHLORIDE 1000 ML: 0.9 INJECTION, SOLUTION INTRAVENOUS at 17:57

## 2025-07-14 RX ADMIN — SODIUM CHLORIDE 125 ML/HR: 0.9 INJECTION, SOLUTION INTRAVENOUS at 23:02

## 2025-07-14 RX ADMIN — PIPERACILLIN AND TAZOBACTAM 4.5 G: 4; .5 INJECTION, POWDER, FOR SOLUTION INTRAVENOUS at 18:36

## 2025-07-14 SDOH — ECONOMIC STABILITY: HOUSING INSECURITY: IN THE PAST 12 MONTHS, HOW MANY TIMES HAVE YOU MOVED WHERE YOU WERE LIVING?: 0

## 2025-07-14 SDOH — SOCIAL STABILITY: SOCIAL INSECURITY: WITHIN THE LAST YEAR, HAVE YOU BEEN HUMILIATED OR EMOTIONALLY ABUSED IN OTHER WAYS BY YOUR PARTNER OR EX-PARTNER?: NO

## 2025-07-14 SDOH — SOCIAL STABILITY: SOCIAL INSECURITY: HAVE YOU HAD ANY THOUGHTS OF HARMING ANYONE ELSE?: NO

## 2025-07-14 SDOH — ECONOMIC STABILITY: HOUSING INSECURITY: AT ANY TIME IN THE PAST 12 MONTHS, WERE YOU HOMELESS OR LIVING IN A SHELTER (INCLUDING NOW)?: NO

## 2025-07-14 SDOH — ECONOMIC STABILITY: HOUSING INSECURITY: IN THE LAST 12 MONTHS, WAS THERE A TIME WHEN YOU WERE NOT ABLE TO PAY THE MORTGAGE OR RENT ON TIME?: NO

## 2025-07-14 SDOH — SOCIAL STABILITY: SOCIAL INSECURITY: ABUSE: ADULT

## 2025-07-14 SDOH — ECONOMIC STABILITY: FOOD INSECURITY: HOW HARD IS IT FOR YOU TO PAY FOR THE VERY BASICS LIKE FOOD, HOUSING, MEDICAL CARE, AND HEATING?: NOT HARD AT ALL

## 2025-07-14 SDOH — SOCIAL STABILITY: SOCIAL INSECURITY: DOES ANYONE TRY TO KEEP YOU FROM HAVING/CONTACTING OTHER FRIENDS OR DOING THINGS OUTSIDE YOUR HOME?: NO

## 2025-07-14 SDOH — ECONOMIC STABILITY: FOOD INSECURITY: WITHIN THE PAST 12 MONTHS, THE FOOD YOU BOUGHT JUST DIDN'T LAST AND YOU DIDN'T HAVE MONEY TO GET MORE.: NEVER TRUE

## 2025-07-14 SDOH — ECONOMIC STABILITY: FOOD INSECURITY: WITHIN THE PAST 12 MONTHS, YOU WORRIED THAT YOUR FOOD WOULD RUN OUT BEFORE YOU GOT THE MONEY TO BUY MORE.: NEVER TRUE

## 2025-07-14 SDOH — SOCIAL STABILITY: SOCIAL INSECURITY: ARE THERE ANY APPARENT SIGNS OF INJURIES/BEHAVIORS THAT COULD BE RELATED TO ABUSE/NEGLECT?: NO

## 2025-07-14 SDOH — SOCIAL STABILITY: SOCIAL INSECURITY
WITHIN THE LAST YEAR, HAVE YOU BEEN KICKED, HIT, SLAPPED, OR OTHERWISE PHYSICALLY HURT BY YOUR PARTNER OR EX-PARTNER?: NO

## 2025-07-14 SDOH — SOCIAL STABILITY: SOCIAL INSECURITY
WITHIN THE LAST YEAR, HAVE YOU BEEN RAPED OR FORCED TO HAVE ANY KIND OF SEXUAL ACTIVITY BY YOUR PARTNER OR EX-PARTNER?: NO

## 2025-07-14 SDOH — SOCIAL STABILITY: SOCIAL INSECURITY: WITHIN THE LAST YEAR, HAVE YOU BEEN AFRAID OF YOUR PARTNER OR EX-PARTNER?: NO

## 2025-07-14 SDOH — SOCIAL STABILITY: SOCIAL INSECURITY: ARE YOU OR HAVE YOU BEEN THREATENED OR ABUSED PHYSICALLY, EMOTIONALLY, OR SEXUALLY BY ANYONE?: NO

## 2025-07-14 SDOH — ECONOMIC STABILITY: INCOME INSECURITY: IN THE PAST 12 MONTHS HAS THE ELECTRIC, GAS, OIL, OR WATER COMPANY THREATENED TO SHUT OFF SERVICES IN YOUR HOME?: NO

## 2025-07-14 SDOH — SOCIAL STABILITY: SOCIAL INSECURITY: WERE YOU ABLE TO COMPLETE ALL THE BEHAVIORAL HEALTH SCREENINGS?: YES

## 2025-07-14 SDOH — SOCIAL STABILITY: SOCIAL INSECURITY: DO YOU FEEL UNSAFE GOING BACK TO THE PLACE WHERE YOU ARE LIVING?: NO

## 2025-07-14 SDOH — ECONOMIC STABILITY: TRANSPORTATION INSECURITY: IN THE PAST 12 MONTHS, HAS LACK OF TRANSPORTATION KEPT YOU FROM MEDICAL APPOINTMENTS OR FROM GETTING MEDICATIONS?: NO

## 2025-07-14 SDOH — SOCIAL STABILITY: SOCIAL INSECURITY: DO YOU FEEL ANYONE HAS EXPLOITED OR TAKEN ADVANTAGE OF YOU FINANCIALLY OR OF YOUR PERSONAL PROPERTY?: NO

## 2025-07-14 SDOH — SOCIAL STABILITY: SOCIAL INSECURITY: HAVE YOU HAD THOUGHTS OF HARMING ANYONE ELSE?: NO

## 2025-07-14 SDOH — SOCIAL STABILITY: SOCIAL INSECURITY: HAS ANYONE EVER THREATENED TO HURT YOUR FAMILY OR YOUR PETS?: NO

## 2025-07-14 ASSESSMENT — PAIN SCALES - GENERAL
PAINLEVEL_OUTOF10: 6
PAINLEVEL_OUTOF10: 0 - NO PAIN
PAINLEVEL_OUTOF10: 7
PAINLEVEL_OUTOF10: 2

## 2025-07-14 ASSESSMENT — ACTIVITIES OF DAILY LIVING (ADL)
LACK_OF_TRANSPORTATION: NO
PATIENT'S MEMORY ADEQUATE TO SAFELY COMPLETE DAILY ACTIVITIES?: NO
FEEDING YOURSELF: INDEPENDENT
LACK_OF_TRANSPORTATION: NO
TOILETING: INDEPENDENT
JUDGMENT_ADEQUATE_SAFELY_COMPLETE_DAILY_ACTIVITIES: NO
BATHING: INDEPENDENT
HEARING - LEFT EAR: FUNCTIONAL
DRESSING YOURSELF: INDEPENDENT
HEARING - RIGHT EAR: FUNCTIONAL
ADEQUATE_TO_COMPLETE_ADL: NO
WALKS IN HOME: INDEPENDENT
GROOMING: INDEPENDENT

## 2025-07-14 ASSESSMENT — LIFESTYLE VARIABLES
SKIP TO QUESTIONS 9-10: 1
EVER HAD A DRINK FIRST THING IN THE MORNING TO STEADY YOUR NERVES TO GET RID OF A HANGOVER: NO
HAVE YOU EVER FELT YOU SHOULD CUT DOWN ON YOUR DRINKING: NO
TOTAL SCORE: 0
AUDIT-C TOTAL SCORE: 1
AUDIT-C TOTAL SCORE: 1
HAVE PEOPLE ANNOYED YOU BY CRITICIZING YOUR DRINKING: NO
EVER FELT BAD OR GUILTY ABOUT YOUR DRINKING: NO
HOW MANY STANDARD DRINKS CONTAINING ALCOHOL DO YOU HAVE ON A TYPICAL DAY: 1 OR 2
HOW OFTEN DO YOU HAVE 6 OR MORE DRINKS ON ONE OCCASION: NEVER
HOW OFTEN DO YOU HAVE A DRINK CONTAINING ALCOHOL: MONTHLY OR LESS

## 2025-07-14 ASSESSMENT — ENCOUNTER SYMPTOMS
FEVER: 0
HEMATURIA: 0
ABDOMINAL PAIN: 1
CHILLS: 0
FLANK PAIN: 0
VOMITING: 0
DIARRHEA: 0
NAUSEA: 0
SHORTNESS OF BREATH: 0
CONSTIPATION: 0
PALPITATIONS: 0
FREQUENCY: 0
DYSURIA: 0

## 2025-07-14 ASSESSMENT — COGNITIVE AND FUNCTIONAL STATUS - GENERAL
DAILY ACTIVITIY SCORE: 24
MOBILITY SCORE: 24
PATIENT BASELINE BEDBOUND: NO

## 2025-07-14 ASSESSMENT — PAIN DESCRIPTION - ORIENTATION: ORIENTATION: MID

## 2025-07-14 ASSESSMENT — PAIN DESCRIPTION - DESCRIPTORS: DESCRIPTORS: ACHING

## 2025-07-14 ASSESSMENT — PATIENT HEALTH QUESTIONNAIRE - PHQ9
1. LITTLE INTEREST OR PLEASURE IN DOING THINGS: NOT AT ALL
SUM OF ALL RESPONSES TO PHQ9 QUESTIONS 1 & 2: 0
2. FEELING DOWN, DEPRESSED OR HOPELESS: NOT AT ALL

## 2025-07-14 ASSESSMENT — PAIN - FUNCTIONAL ASSESSMENT
PAIN_FUNCTIONAL_ASSESSMENT: 0-10
PAIN_FUNCTIONAL_ASSESSMENT: 0-10

## 2025-07-14 ASSESSMENT — PAIN DESCRIPTION - PAIN TYPE: TYPE: ACUTE PAIN

## 2025-07-14 ASSESSMENT — PAIN DESCRIPTION - LOCATION
LOCATION: ABDOMEN
LOCATION: ABDOMEN

## 2025-07-14 NOTE — ED PROVIDER NOTES
HPI   Chief Complaint   Patient presents with    Abdominal Pain     Mid abdominal pain, no nvd       50-year-old male with history of hypertension, DVT on Eliquis presenting to the ER today with abdominal pain for the past 2 to 3 days.  Patient states that he initially noticed some discomfort but has not had any nausea or vomiting.  There was no fall or injury to cause the symptoms and he has not had a fever.  The pain does not radiate into his back.  He is not having any issues urinating, painful urination or blood in the urine.  He states he did feel constipated so he took a laxative and had a full bowel movement last night without any dark or bloody stools and has been passing gas.  Patient tells me when he moves or walks his pain in his abdomen becomes worse.  He is not having any chest pain or shortness of breath.  He took some over-the-counter pain medication before coming to the ER.  He has not any previous abdominal surgeries.  No further complaints.      History provided by:  Patient          Patient History   Medical History[1]  Surgical History[2]  Family History[3]  Social History[4]    Physical Exam   ED Triage Vitals [07/14/25 1747]   Temperature Heart Rate Respirations BP   36.7 °C (98.1 °F) (!) 111 17 98/55      Pulse Ox Temp Source Heart Rate Source Patient Position   98 % Temporal Monitor --      BP Location FiO2 (%)     -- --       Physical Exam      ED Course & MDM   Diagnoses as of 07/14/25 2110   Acute occlusion of mesenteric vein (Multi)   Colitis   Acute kidney injury                 No data recorded     [place] Coma Scale Score: 15 (07/14/25 1748 : Oksana Pineda RN)                           Medical Decision Making  50-year-old male with history of hypertension, DVT on Eliquis, no previous abdominal surgeries presenting the ER today with 2 to 3-day history of lower abdominal pain, decreased appetite.  He initially felt constipated but took a laxative and is having regular bowel movements.   No further complaints the patient arrives afebrile tachycardic.  He is resting without signs of acute distress and mentating appropriately.  On my exam he is tachycardic but regular, lungs are clear and there is no CVA tenderness.  Abdomen is soft and nondistended and he is tender in the periumbilical region, suprapubic and right lower quadrant with guarding.  Exam is otherwise within normal limits.  IV, labs, urinalysis and CT abdomen pelvis are ordered as well as 1 L IV fluids and IV fentanyl for pain relief.  Patient was agreeable with this plan.    CBC does come back with a leukocytosis of 28.8.  Stable hemoglobin.  At that time sepsis was suspected at 1820, sepsis alert is pushed.  Additional fluids are ordered as well as blood cultures, IV Zosyn.  Case is discussed with my attending who agrees with assessment.    ECG per my interpretation sinus rhythm at 99 bpm with nonspecific changes.  No acute ST elevations or depressions. Lactate level is 1.7.  Lipase within normal limits.  CMP notable for acute kidney injury with GFR of 36 which is new for the patient.  Glucose is 123 and sodium is 127.  He is receiving IV fluids currently.  Troponin is 7.  CT performed today shows findings concerning for ischemic ileitis and proximal colitis secondary to vascular congestion from thrombosed ileocolic vein with associated mesenteric haziness/stranding and small amount of free fluid.  No evidence of pneumatosis.  The ileocolic vein is with concerning findings for thrombosis and associated thrombophlebitis.  General surgery will be consulted.    Case is discussed with Dr. Pitts reviewed the patient's CT.  He would like the patient started on high intensity heparin, additional fluids ordered.  Ultimately the patient will be admitted to medicine and he would like vascular on consult as well.  He will continue to monitor the patient but states there is no acute surgical intervention needed currently but he will be closely  monitored in the hospital.    I then discussed the case with Dr. Richter who is on for vascular surgery, he states there is no acute vascular intervention needed to be performed at this time the patient are to remain on high intensity heparin and they will be on consult.    Case is then discussed with KEILA Palmer who accepts patient for admission.  Patient has been compliant with his Eliquis, states he has been on the Eliquis ever since diagnosed with a DVT at the start of COVID a few years ago but they could never find the cause of the DVT.  He is denying any chest pain or shortness of breath.  Patient is much agreeable with admission today and is resting comfortably.        Labs Reviewed   CBC WITH AUTO DIFFERENTIAL - Abnormal       Result Value    WBC 28.8 (*)     nRBC 0.0      RBC 5.58      Hemoglobin 16.0      Hematocrit 47.2      MCV 85      MCH 28.7      MCHC 33.9      RDW 13.1      Platelets 372      Neutrophils % 84.9      Immature Granulocytes %, Automated 1.7 (*)     Lymphocytes % 5.1      Monocytes % 7.8      Eosinophils % 0.2      Basophils % 0.3      Neutrophils Absolute 24.43 (*)     Immature Granulocytes Absolute, Automated 0.50      Lymphocytes Absolute 1.46      Monocytes Absolute 2.23 (*)     Eosinophils Absolute 0.05      Basophils Absolute 0.09     COMPREHENSIVE METABOLIC PANEL - Abnormal    Glucose 123 (*)     Sodium 127 (*)     Potassium 3.7      Chloride 96 (*)     Bicarbonate 18 (*)     Anion Gap 17      Urea Nitrogen 43 (*)     Creatinine 2.16 (*)     eGFR 36 (*)     Calcium 9.0      Albumin 3.9      Alkaline Phosphatase 84      Total Protein 7.7      AST 12      Bilirubin, Total 1.8 (*)     ALT 26     LIPASE - Normal    Lipase 10      Narrative:     Venipuncture immediately after or during the administration of Metamizole may lead to falsely low results. Testing should be performed immediately prior to Metamizole dosing.   LACTATE - Normal    Lactate 1.7      Narrative:      Venipuncture immediately after or during the administration of Metamizole may lead to falsely low results. Testing should be performed immediately prior to Metamizole dosing.   TROPONIN I, HIGH SENSITIVITY - Normal    Troponin I, High Sensitivity 7      Narrative:     Less than 99th percentile of normal range cutoff-  Female and children under 18 years old <14 ng/L; Male <21 ng/L: Negative  Repeat testing should be performed if clinically indicated.     Female and children under 18 years old 14-50 ng/L; Male 21-50 ng/L:  Consistent with possible cardiac damage and possible increased clinical   risk. Serial measurements may help to assess extent of myocardial damage.     >50 ng/L: Consistent with cardiac damage, increased clinical risk and  myocardial infarction. Serial measurements may help assess extent of   myocardial damage.      NOTE: Children less than 1 year old may have higher baseline troponin   levels and results should be interpreted in conjunction with the overall   clinical context.     NOTE: Troponin I testing is performed using a different   testing methodology at [facility] than at other   [facility]. Direct result comparisons should only   be made within the same method.   BLOOD CULTURE   BLOOD CULTURE   URINALYSIS WITH REFLEX CULTURE AND MICROSCOPIC    Narrative:     The following orders were created for panel order Urinalysis with Reflex Culture and Microscopic.  Procedure                               Abnormality         Status                     ---------                               -----------         ------                     Urinalysis with Reflex C...[[id number]]                                                 Extra Urine Gray Tube[[id number]]                                                         Please view results for these tests on the individual orders.   URINALYSIS WITH REFLEX CULTURE AND MICROSCOPIC   EXTRA URINE GRAY TUBE       CT abdomen pelvis w IV contrast    Final Result   1. Constellation of the imaging findings concerning for ischemic   ileitis and proximal colitis secondary to vascular congestion from   thrombosed ileocolic vein, with circumferentially thickened wall   present in the distal/terminal ileum, cecum and proximal ascending   colon with the associated mesenteric haziness/stranding and small   amount of free fluid. There is no evidence of pneumatosis   intestinalis at this time.   2. The ileocolic vein is somewhat indistinct in appearance and   demonstrates slightly decreased attenuation relative to other venous   structures in the abdomen and pelvis with some surrounding fat   stranding, most suggestive of thrombosis and associated   thrombophlebitis.   3. There is no wall thickening present in the more distal ascending,   transverse or descending colon, although there is some intraluminal   liquid stool, possibly representing component of enterocolitis.   4. Liver is enlarged, and demonstrates slightly decreased attenuation   relative to the spleen suggestive of fatty infiltration. Correlate   with serum LFTs.   5. Scattered diverticula are present throughout the descending and   sigmoid colon without evidence of acute diverticulitis.        MACRO:   Mark Rzd discussed the significance and urgency of this   critical finding by EPIC secure chat with  DANIELLE CAVAZOS on   7/14/2025 at 7:27 pm.  (**-RCF-**) Findings:  See findings.        .        Signed by: Mark Rdz 7/14/2025 7:28 PM   Dictation workstation:   [id number]            Procedure  Critical Care    Performed by: Danielle Cavazos PA-C  Authorized by: Fabiano Ortega MD    Critical care provider statement:     Critical care time (minutes):  45    Critical care time was exclusive of:  Separately billable procedures and treating other patients    Critical care was necessary to treat or prevent imminent or life-threatening deterioration of the following  conditions:  Dehydration, sepsis and Other (use comment box to enter another option) (Thrombosed abdominal vein requiring IV heparin)    Critical care was time spent personally by me on the following activities:  Blood draw for specimens, development of treatment plan with patient or surrogate, discussions with consultants, evaluation of patient's response to treatment, examination of patient, obtaining history from patient or surrogate, re-evaluation of patient's condition, ordering and performing treatments and interventions, ordering and review of laboratory studies and ordering and review of radiographic studies    Care discussed with: admitting provider           [1]   Past Medical History:  Diagnosis Date    Personal history of other endocrine, nutritional and metabolic disease     History of hyperlipidemia   [2] No past surgical history on file.  [3] No family history on file.  [4]   Social History  Tobacco Use    Smoking status: Former     Current packs/day: 0.00     Average packs/day: 1 pack/day for 10.0 years (10.0 ttl pk-yrs)     Types: Cigars, Cigarettes     Start date: 2000     Quit date: 2010     Years since quitting: 15.5    Smokeless tobacco: Never   Substance Use Topics    Alcohol use: Not Currently    Drug use: Never        Danielle Dobbs PA-C  07/14/25 9241

## 2025-07-15 ENCOUNTER — APPOINTMENT (OUTPATIENT)
Dept: CARDIOLOGY | Facility: HOSPITAL | Age: 50
DRG: 394 | End: 2025-07-15
Payer: COMMERCIAL

## 2025-07-15 LAB
ANION GAP SERPL CALC-SCNC: 14 MMOL/L (ref 10–20)
AORTIC VALVE MEAN GRADIENT: 7 MMHG
AORTIC VALVE PEAK VELOCITY: 1.65 M/S
ATRIAL RATE: 99 BPM
AV PEAK GRADIENT: 11 MMHG
AVA (PEAK VEL): 2.48 CM2
AVA (VTI): 2.31 CM2
BUN SERPL-MCNC: 38 MG/DL (ref 6–23)
CALCIUM SERPL-MCNC: 7.9 MG/DL (ref 8.6–10.3)
CHLORIDE SERPL-SCNC: 101 MMOL/L (ref 98–107)
CO2 SERPL-SCNC: 16 MMOL/L (ref 21–32)
CORTIS AM PEAK SERPL-MSCNC: 33.8 UG/DL (ref 5–20)
CREAT SERPL-MCNC: 1.55 MG/DL (ref 0.5–1.3)
CREAT UR-MCNC: 124.8 MG/DL (ref 20–370)
EGFRCR SERPLBLD CKD-EPI 2021: 54 ML/MIN/1.73M*2
EJECTION FRACTION APICAL 4 CHAMBER: 65.7
EJECTION FRACTION: 63 %
ERYTHROCYTE [DISTWIDTH] IN BLOOD BY AUTOMATED COUNT: 13 % (ref 11.5–14.5)
GLUCOSE SERPL-MCNC: 119 MG/DL (ref 74–99)
HCT VFR BLD AUTO: 40.7 % (ref 41–52)
HGB BLD-MCNC: 13.9 G/DL (ref 13.5–17.5)
HOLD SPECIMEN: NORMAL
LEFT ATRIUM VOLUME AREA LENGTH INDEX BSA: 32.4 ML/M2
LEFT VENTRICLE INTERNAL DIMENSION DIASTOLE: 5.1 CM (ref 3.5–6)
LEFT VENTRICULAR OUTFLOW TRACT DIAMETER: 2 CM
LV EJECTION FRACTION BIPLANE: 64 %
MCH RBC QN AUTO: 28.8 PG (ref 26–34)
MCHC RBC AUTO-ENTMCNC: 34.2 G/DL (ref 32–36)
MCV RBC AUTO: 84 FL (ref 80–100)
MITRAL VALVE E/A RATIO: 1.19
NRBC BLD-RTO: 0 /100 WBCS (ref 0–0)
OSMOLALITY SERPL: 281 MOSM/KG (ref 280–300)
OSMOLALITY UR: 542 MOSM/KG (ref 200–1200)
P AXIS: 40 DEGREES
P OFFSET: 189 MS
P ONSET: 145 MS
PLATELET # BLD AUTO: 305 X10*3/UL (ref 150–450)
POTASSIUM SERPL-SCNC: 3.6 MMOL/L (ref 3.5–5.3)
PR INTERVAL: 142 MS
Q ONSET: 216 MS
QRS COUNT: 17 BEATS
QRS DURATION: 84 MS
QT INTERVAL: 346 MS
QTC CALCULATION(BAZETT): 444 MS
QTC FREDERICIA: 408 MS
R AXIS: 65 DEGREES
RBC # BLD AUTO: 4.83 X10*6/UL (ref 4.5–5.9)
RIGHT VENTRICLE FREE WALL PEAK S': 19.7 CM/S
SODIUM SERPL-SCNC: 127 MMOL/L (ref 136–145)
SODIUM UR-SCNC: 52 MMOL/L
SODIUM/CREAT UR-RTO: 42 MMOL/G CREAT
T AXIS: 51 DEGREES
T OFFSET: 389 MS
TRICUSPID ANNULAR PLANE SYSTOLIC EXCURSION: 2.3 CM
TSH SERPL-ACNC: 0.94 MIU/L (ref 0.44–3.98)
UFH PPP CHRO-ACNC: 0.5 IU/ML (ref ?–1.1)
VENTRICULAR RATE: 99 BPM
WBC # BLD AUTO: 22.9 X10*3/UL (ref 4.4–11.3)

## 2025-07-15 PROCEDURE — 84443 ASSAY THYROID STIM HORMONE: CPT | Performed by: STUDENT IN AN ORGANIZED HEALTH CARE EDUCATION/TRAINING PROGRAM

## 2025-07-15 PROCEDURE — 83930 ASSAY OF BLOOD OSMOLALITY: CPT | Mod: ELYLAB | Performed by: STUDENT IN AN ORGANIZED HEALTH CARE EDUCATION/TRAINING PROGRAM

## 2025-07-15 PROCEDURE — 2500000004 HC RX 250 GENERAL PHARMACY W/ HCPCS (ALT 636 FOR OP/ED): Performed by: PHYSICIAN ASSISTANT

## 2025-07-15 PROCEDURE — 36415 COLL VENOUS BLD VENIPUNCTURE: CPT | Performed by: STUDENT IN AN ORGANIZED HEALTH CARE EDUCATION/TRAINING PROGRAM

## 2025-07-15 PROCEDURE — 2500000001 HC RX 250 WO HCPCS SELF ADMINISTERED DRUGS (ALT 637 FOR MEDICARE OP): Performed by: STUDENT IN AN ORGANIZED HEALTH CARE EDUCATION/TRAINING PROGRAM

## 2025-07-15 PROCEDURE — 85300 ANTITHROMBIN III ACTIVITY: CPT | Mod: ELYLAB | Performed by: STUDENT IN AN ORGANIZED HEALTH CARE EDUCATION/TRAINING PROGRAM

## 2025-07-15 PROCEDURE — 86147 CARDIOLIPIN ANTIBODY EA IG: CPT | Mod: ELYLAB | Performed by: STUDENT IN AN ORGANIZED HEALTH CARE EDUCATION/TRAINING PROGRAM

## 2025-07-15 PROCEDURE — 85305 CLOT INHIBIT PROT S TOTAL: CPT | Performed by: STUDENT IN AN ORGANIZED HEALTH CARE EDUCATION/TRAINING PROGRAM

## 2025-07-15 PROCEDURE — 85730 THROMBOPLASTIN TIME PARTIAL: CPT | Mod: ELYLAB | Performed by: STUDENT IN AN ORGANIZED HEALTH CARE EDUCATION/TRAINING PROGRAM

## 2025-07-15 PROCEDURE — 99221 1ST HOSP IP/OBS SF/LOW 40: CPT | Performed by: REGISTERED NURSE

## 2025-07-15 PROCEDURE — 82533 TOTAL CORTISOL: CPT | Mod: ELYLAB | Performed by: STUDENT IN AN ORGANIZED HEALTH CARE EDUCATION/TRAINING PROGRAM

## 2025-07-15 PROCEDURE — 80048 BASIC METABOLIC PNL TOTAL CA: CPT | Performed by: NURSE PRACTITIONER

## 2025-07-15 PROCEDURE — 85027 COMPLETE CBC AUTOMATED: CPT | Performed by: NURSE PRACTITIONER

## 2025-07-15 PROCEDURE — 2500000004 HC RX 250 GENERAL PHARMACY W/ HCPCS (ALT 636 FOR OP/ED): Performed by: REGISTERED NURSE

## 2025-07-15 PROCEDURE — 2500000004 HC RX 250 GENERAL PHARMACY W/ HCPCS (ALT 636 FOR OP/ED): Mod: JZ | Performed by: NURSE PRACTITIONER

## 2025-07-15 PROCEDURE — 85302 CLOT INHIBIT PROT C ANTIGEN: CPT | Performed by: STUDENT IN AN ORGANIZED HEALTH CARE EDUCATION/TRAINING PROGRAM

## 2025-07-15 PROCEDURE — 99222 1ST HOSP IP/OBS MODERATE 55: CPT | Performed by: PHYSICIAN ASSISTANT

## 2025-07-15 PROCEDURE — 99233 SBSQ HOSP IP/OBS HIGH 50: CPT | Performed by: STUDENT IN AN ORGANIZED HEALTH CARE EDUCATION/TRAINING PROGRAM

## 2025-07-15 PROCEDURE — 85520 HEPARIN ASSAY: CPT | Performed by: STUDENT IN AN ORGANIZED HEALTH CARE EDUCATION/TRAINING PROGRAM

## 2025-07-15 PROCEDURE — 2500000004 HC RX 250 GENERAL PHARMACY W/ HCPCS (ALT 636 FOR OP/ED): Performed by: STUDENT IN AN ORGANIZED HEALTH CARE EDUCATION/TRAINING PROGRAM

## 2025-07-15 PROCEDURE — 93306 TTE W/DOPPLER COMPLETE: CPT

## 2025-07-15 PROCEDURE — 1200000002 HC GENERAL ROOM WITH TELEMETRY DAILY

## 2025-07-15 PROCEDURE — 93306 TTE W/DOPPLER COMPLETE: CPT | Performed by: INTERNAL MEDICINE

## 2025-07-15 PROCEDURE — 81241 F5 GENE: CPT | Performed by: STUDENT IN AN ORGANIZED HEALTH CARE EDUCATION/TRAINING PROGRAM

## 2025-07-15 RX ORDER — SODIUM CHLORIDE, SODIUM LACTATE, POTASSIUM CHLORIDE, CALCIUM CHLORIDE 600; 310; 30; 20 MG/100ML; MG/100ML; MG/100ML; MG/100ML
100 INJECTION, SOLUTION INTRAVENOUS CONTINUOUS
Status: ACTIVE | OUTPATIENT
Start: 2025-07-15 | End: 2025-07-16

## 2025-07-15 RX ORDER — LISINOPRIL 20 MG/1
40 TABLET ORAL DAILY
Status: DISCONTINUED | OUTPATIENT
Start: 2025-07-15 | End: 2025-07-16

## 2025-07-15 RX ADMIN — LISINOPRIL 40 MG: 20 TABLET ORAL at 14:02

## 2025-07-15 RX ADMIN — PIPERACILLIN SODIUM AND TAZOBACTAM SODIUM 3.38 G: 3; .375 INJECTION, SOLUTION INTRAVENOUS at 10:10

## 2025-07-15 RX ADMIN — PIPERACILLIN SODIUM AND TAZOBACTAM SODIUM 3.38 G: 3; .375 INJECTION, SOLUTION INTRAVENOUS at 01:12

## 2025-07-15 RX ADMIN — SODIUM CHLORIDE, POTASSIUM CHLORIDE, SODIUM LACTATE AND CALCIUM CHLORIDE 100 ML/HR: 600; 310; 30; 20 INJECTION, SOLUTION INTRAVENOUS at 14:02

## 2025-07-15 RX ADMIN — HYDROMORPHONE HYDROCHLORIDE 0.2 MG: 0.2 INJECTION, SOLUTION INTRAMUSCULAR; INTRAVENOUS; SUBCUTANEOUS at 00:42

## 2025-07-15 RX ADMIN — HEPARIN SODIUM 2000 UNITS/HR: 10000 INJECTION, SOLUTION INTRAVENOUS at 10:50

## 2025-07-15 RX ADMIN — PIPERACILLIN SODIUM AND TAZOBACTAM SODIUM 3.38 G: 3; .375 INJECTION, SOLUTION INTRAVENOUS at 16:39

## 2025-07-15 ASSESSMENT — PAIN SCALES - GENERAL
PAINLEVEL_OUTOF10: 0 - NO PAIN
PAINLEVEL_OUTOF10: 4

## 2025-07-15 ASSESSMENT — ACTIVITIES OF DAILY LIVING (ADL): LACK_OF_TRANSPORTATION: NO

## 2025-07-15 ASSESSMENT — PAIN - FUNCTIONAL ASSESSMENT
PAIN_FUNCTIONAL_ASSESSMENT: 0-10
PAIN_FUNCTIONAL_ASSESSMENT: 0-10

## 2025-07-15 ASSESSMENT — ENCOUNTER SYMPTOMS
BLOOD IN STOOL: 0
DIARRHEA: 1
ABDOMINAL PAIN: 1

## 2025-07-15 NOTE — PROGRESS NOTES
07/15/25 1444   Discharge Planning   Living Arrangements Alone   Support Systems Family members   Assistance Needed independent PTA, works and drives, no assist devices   Type of Residence Private residence   Number of Stairs to Enter Residence 0   Number of Stairs Within Residence 0   Do you have animals or pets at home? No   Who is requesting discharge planning? Patient   Home or Post Acute Services None   Expected Discharge Disposition Home   Does the patient need discharge transport arranged? No   Financial Resource Strain   How hard is it for you to pay for the very basics like food, housing, medical care, and heating? Not hard   Housing Stability   In the last 12 months, was there a time when you were not able to pay the mortgage or rent on time? N   At any time in the past 12 months, were you homeless or living in a shelter (including now)? N   Transportation Needs   In the past 12 months, has lack of transportation kept you from medical appointments or from getting medications? no   In the past 12 months, has lack of transportation kept you from meetings, work, or from getting things needed for daily living? No   Stroke Family Assessment   Stroke Family Assessment Needed No   Intensity of Service   Intensity of Service 0-30 min     Pt admitted with acute occlusion of mesenteric (ileocolic) vein, colitis, abdominal pain. Hx of DVT- on Eliquis- reports some of the evening doses due to work schedule. Pt lives alone in 1 story home, independent PTA. Still works and drives, is EMT. Blood work/hematology studies pending. Plan is to dc home, no needs.

## 2025-07-15 NOTE — SIGNIFICANT EVENT
Only having abdominal pain with movement, denies nausea, vomiting; afebrile and wbc trending down    Abd: soft, obese, non-distended, moderate tenderness in lower abdominal region, no peritoneal sign    A/P: I discussed with patient and family member the CT scan finding showing the stranding, wall thickening around the terminal ileum and right colon with thrombus in the ileocolic vein.  Per vascular surgery, no intervention needed. Heparin gtt started overnight.  Pain is improved considerably.  Continue with fluid resuscitation with plan to get CT angio abd/pelvis scan tomorrow or sooner depending on clinical course.  We talked about indications for surgery which are evidence of sepsis, worsening ischemic changes on the repeat CT scan.  Will monitor very closely.  All questions answered.  Clear liquid diet, IV fluid, antibiotic.

## 2025-07-15 NOTE — H&P
"History Of Present Illness  Tien Prakash is a 50 y.o. male with a past medical history of DVT (post covid, on Eliquis) and HTN who presented to the ED with abdominal pain. He reports the pain is an aching, \"like doing a bunch of sit ups\" that began 2 or 3 days ago. The pain is worse with ambulation or movement. He denies any nausea or vomiting. He reports being compliant with his Eliquis. He does endorse a family history of DVT/PE.      Past Medical History  Medical History[1]    Surgical History  Surgical History[2]     Social History  He reports that he quit smoking about 15 years ago. His smoking use included cigars and cigarettes. He started smoking about 25 years ago. He has a 10 pack-year smoking history. He has never used smokeless tobacco. He reports that he does not currently use alcohol. He reports that he does not use drugs.    Family History  Family History[3]     Allergies  Patient has no known allergies.    Review of Systems   Constitutional:  Negative for chills and fever.   Respiratory:  Negative for shortness of breath.    Cardiovascular:  Negative for chest pain and palpitations.   Gastrointestinal:  Positive for abdominal pain. Negative for constipation, diarrhea, nausea and vomiting.   Genitourinary:  Negative for dysuria, flank pain, frequency, hematuria and urgency.   All other systems reviewed and are negative.       Physical Exam  Vitals reviewed.   Constitutional:       Appearance: He is obese.   HENT:      Head: Normocephalic and atraumatic.   Cardiovascular:      Rate and Rhythm: Normal rate and regular rhythm.      Heart sounds: Normal heart sounds.   Pulmonary:      Effort: Pulmonary effort is normal.      Breath sounds: Normal air entry.   Abdominal:      General: Bowel sounds are normal.      Palpations: Abdomen is soft.      Tenderness: There is abdominal tenderness.   Musculoskeletal:         General: No deformity.   Skin:     General: Skin is warm and dry.   Neurological:      " "General: No focal deficit present.      Mental Status: He is alert and oriented to person, place, and time.   Psychiatric:         Mood and Affect: Mood normal.         Behavior: Behavior normal.          Last Recorded Vitals  Blood pressure 114/61, pulse 91, temperature 36.5 °C (97.7 °F), temperature source Temporal, resp. rate 17, height 1.778 m (5' 10\"), weight 113 kg (250 lb), SpO2 99%.    Relevant Results      Lab Results   Component Value Date    WBC 28.8 (H) 07/14/2025    HGB 16.0 07/14/2025    HCT 47.2 07/14/2025    MCV 85 07/14/2025     07/14/2025     Lab Results   Component Value Date    GLUCOSE 123 (H) 07/14/2025    CALCIUM 9.0 07/14/2025     (L) 07/14/2025    K 3.7 07/14/2025    CO2 18 (L) 07/14/2025    CL 96 (L) 07/14/2025    BUN 43 (H) 07/14/2025    CREATININE 2.16 (H) 07/14/2025     CT abdomen pelvis w IV contrast  Narrative: Interpreted By:  Mark Rdz,   STUDY:  CT ABDOMEN PELVIS W IV CONTRAST;  7/14/2025 7:01 pm      INDICATION:  Signs/Symptoms:periumbilical pain, suprapubic and right lower  quadrant pain.          COMPARISON:  None.      ACCESSION NUMBER(S):  [id number]      ORDERING CLINICIAN:  KATHARINE CAVAZOS      TECHNIQUE:  Contiguous axial images of the abdomen and pelvis were obtained after  the intravenous administration of 75 mL of Omnipaque 350 iodinated  contrast. Coronal and sagittal reformatted images were reconstructed  from the axial data.      FINDINGS:  LOWER CHEST: Included lung bases are clear without consolidation or  sizable effusion.      Heart is normal in size without pericardial effusion.      Distal esophagus is unremarkable in appearance.      ABDOMEN/PELVIS:      ABDOMINAL WALL: Cutaneous tissues of the abdomen and pelvis do not  demonstrate any acute abnormality.      LIVER: No acute hepatic parenchymal abnormality is present. Liver  demonstrates slightly decreased attenuation relative to the spleen  suggestive of fatty infiltration. Liver " is also enlarged, measuring  up to 22 cm in craniocaudal dimension.      Portal vein is unremarkable in appearance.      BILE DUCTS: No intrahepatic or extrahepatic biliary dilatation is  present.      GALLBLADDER: No gallbladder wall thickening is noted.      PANCREAS: No pancreatic ductal dilatation or peripancreatic stranding  is present.      SPLEEN: No acute splenic abnormality is present.      ADRENALS: Bilateral adrenal glands are unremarkable in appearance.      KIDNEYS, URETERS, BLADDER: Kidneys are symmetric in size and  enhancement without evidence of hydronephrosis or radiopaque  nephrolithiasis.      Upper ureters are unremarkable in appearance.      Bladder is decompressed and unremarkable.      REPRODUCTIVE ORGANS: Prostate is unremarkable in appearance.      VESSELS: No acute abnormality is identified within the abdominal  aorta or larger arteries in the abdomen or pelvis.      There is somewhat indistinct appearance of the ileocolic vein in the  right mid abdomen (series 201, image 81), with the vessel  demonstrates slightly decreased attenuation relative to other venous  structures in the abdomen, including of the superior mesenteric vein  and mild adjacent fat stranding.      RETROPERITONEUM/LYMPH NODES: No acute retroperitoneal abnormality. No  enlarged lymph nodes.      BOWEL/MESENTERY/PERITONEUM: Stomach is decompressed and unremarkable  in appearance. No abnormal small bowel dilatation is present. There  is circumferential wall thickening present in the distal and terminal  ileum as well as in the cecum and proximal ascending colon, with some  surrounding mesenteric haziness/stranding and small amount of free  fluid in the right lower quadrant. There is no evidence of  pneumatosis intestinalis.      More distal ascending, transverse and descending colon demonstrates  some liquid stool but without evidence of the new wall thickening.      Numerous diverticula are present in the descending and  sigmoid colon  without evidence of acute diverticulitis.      Appendix has small amount of the adjacent fluid but does not appear  to be enlarged or demonstrate any wall thickening.      Small amount of fluid is present in the right lower quadrant of the  abdomen. No free air or thick-walled collections are identified.          MUSCULOSKELETAL: No acute osseous abnormality in the visualized spine  or pelvis. No suspicious osseous lesion.      Impression: 1. Constellation of the imaging findings concerning for ischemic  ileitis and proximal colitis secondary to vascular congestion from  thrombosed ileocolic vein, with circumferentially thickened wall  present in the distal/terminal ileum, cecum and proximal ascending  colon with the associated mesenteric haziness/stranding and small  amount of free fluid. There is no evidence of pneumatosis  intestinalis at this time.  2. The ileocolic vein is somewhat indistinct in appearance and  demonstrates slightly decreased attenuation relative to other venous  structures in the abdomen and pelvis with some surrounding fat  stranding, most suggestive of thrombosis and associated  thrombophlebitis.  3. There is no wall thickening present in the more distal ascending,  transverse or descending colon, although there is some intraluminal  liquid stool, possibly representing component of enterocolitis.  4. Liver is enlarged, and demonstrates slightly decreased attenuation  relative to the spleen suggestive of fatty infiltration. Correlate  with serum LFTs.  5. Scattered diverticula are present throughout the descending and  sigmoid colon without evidence of acute diverticulitis.      MACRO:  Mark Rdz discussed the significance and urgency of this  critical finding by EPIC secure chat with  KATHARINE CAVAZOS on  7/14/2025 at 7:27 pm.  (**-RCF-**) Findings:  See findings.      .      Signed by: Mark Rdz 7/14/2025 7:28 PM  Dictation workstation:    [id number]    ED Medication Administration from 07/14/2025 1737 to 07/14/2025 2109         Date/Time Order Dose Route Action Action by     07/14/2025 1757 EDT fentaNYL PF (Sublimaze) injection 50 mcg 50 mcg intravenous Given Edwin, PRITESH     07/14/2025 1757 EDT sodium chloride 0.9 % bolus 1,000 mL 1,000 mL intravenous New Bag Edwin, C     07/14/2025 1828 EDT sodium chloride 0.9 % bolus 2,190 mL 2,250 mL intravenous New Bag Mercado, P     07/14/2025 1836 EDT piperacillin-tazobactam (Zosyn) 4.5 g in sodium chloride 0.9%  mL 4.5 g intravenous New Bag Mercado, P     07/14/2025 1858 EDT iohexol (OMNIPaque) 350 mg iodine/mL solution 75 mL 75 mL intravenous Given CATRINA Díaz     07/14/2025 1928 EDT piperacillin-tazobactam (Zosyn) 4.5 g in sodium chloride 0.9%  mL 0 g intravenous Stopped Elmore, P     07/14/2025 2022 EDT sodium chloride 0.9 % bolus 1,000 mL 0 mL intravenous Stopped Elmore, P     07/14/2025 2036 EDT fentaNYL PF (Sublimaze) injection 50 mcg 50 mcg intravenous Given Elmore, P     07/14/2025 2102 EDT heparin bolus from bag 9,000 Units -- intravenous Canceled Entry CALI Mercado               Assessment & Plan  Acute occlusion of mesenteric vein (Multi)      #Ileocolic thrombosis  #History of DVT  #Leukocytosis  -Vascular contacted by ED, no emergent intervention  -Heparin gtt (high intensity) started in ED (no loading bolus, on Eliquis), continue  -Continue Zosyn  -Consult vascular  -Dilaudid for pain  -Consult heme/onc given likely treatment failure of Eliquis    #MESHA  #Hyponatremia  #Hyperbilirubinemia  -Will give NS @ 125 mL/hr overnight  -Avoid nephrotoxins, renal dosing  -Follow BMP    #HTN  -Hold home lisinopril        Humza Hutson, APRN-CNP         [1]   Past Medical History:  Diagnosis Date    Personal history of other endocrine, nutritional and metabolic disease     History of hyperlipidemia   [2] No past surgical history on file.  [3] No family history on file.

## 2025-07-15 NOTE — CARE PLAN
The patient's goals for the shift include      The clinical goals for the shift include remain HDS    Over the shift, the patient did not make progress toward the following goals.    Problem: Pain - Adult  Goal: Verbalizes/displays adequate comfort level or baseline comfort level  Outcome: Progressing     Problem: Safety - Adult  Goal: Free from fall injury  Outcome: Progressing     Problem: Discharge Planning  Goal: Discharge to home or other facility with appropriate resources  Outcome: Progressing     Problem: Chronic Conditions and Co-morbidities  Goal: Patient's chronic conditions and co-morbidity symptoms are monitored and maintained or improved  Outcome: Progressing     Problem: Nutrition  Goal: Nutrient intake appropriate for maintaining nutritional needs  Outcome: Progressing

## 2025-07-15 NOTE — CONSULTS
Reason For Consult  Ileocolic thrombosis    History Of Present Illness  Tein Prakash is a 50 y.o. male with history of DVTs around the time of COVID, on Eliquis BID presenting with acute abdominal pain, described as aching. He had leukocytosis with WBC of 29 when CT was done due to concern for appendicitis. However, that showed the ileocolic thrombosis and concern for ileitis/colitis. General surgery was called and vascular surgery (Dr. Delgado) were called and patient was placed on high intensity heparin drip. Antibiotics given high WBC. Bilirubin was high and fluids were given. No emergent intervention planned and vascular surgery now following up with formal consult.     Patient states he has been feeling otherwise. He is on Eliquis BID but working at the [employer], he takes his Eliquis at 2000 and said he may have missed 4-5 doses over the last month.      Past Medical History  He has a past medical history of Personal history of other endocrine, nutritional and metabolic disease.    Surgical History  He has no past surgical history on file.     Social History  He reports that he quit smoking about 15 years ago. His smoking use included cigars and cigarettes. He started smoking about 25 years ago. He has a 10 pack-year smoking history. He has never used smokeless tobacco. He reports that he does not currently use alcohol. He reports that he does not use drugs.    Family History  Family History[1]     Allergies  Patient has no known allergies.       Physical Exam  Constitutional:       Appearance: Normal appearance.   HENT:      Head: Normocephalic.      Mouth/Throat:      Mouth: Mucous membranes are moist.   Eyes:      Extraocular Movements: Extraocular movements intact.   Pulmonary:      Effort: Pulmonary effort is normal.   Abdominal:      General: Abdomen is flat. Bowel sounds are normal.      Palpations: Abdomen is soft.      Tenderness: There is abdominal tenderness.   Musculoskeletal:      Cervical  "back: Normal range of motion.   Neurological:      Mental Status: He is alert.           Last Recorded Vitals  Blood pressure 118/60, pulse 90, temperature 37.7 °C (99.9 °F), temperature source Temporal, resp. rate 18, height 1.778 m (5' 10\"), weight 113 kg (250 lb), SpO2 93%.    Relevant Results  WBC 22.9 this morning. No differential done  BMP showing      Assessment/Plan  This is an extremely pleasant 51 yo male with history of DVT since around the time of COVID. Has been on Eliquis but has missed some doses (estimates 4-5 over the last month). Here with abdominal pain and CT showing ileocolic thrombosis with ileitis/colitis due to the congestion. This was discussed with Dr. Blunt. Defer to general surgery/medicine for further management. No surgical intervention planned    - echo  - hematology workup  - vascular medicine consult as an outpatient (Dr. Jerrica Marquez - we will obtain appointment)  - we will sign off    I spent 60 minutes in the professional and overall care of this patient.      John Panchal PA-C      ADDENDUM:    Echo done. Normal LV function. No note of thrombus of complication. No RMA. Likely noncompliance as culprit of clot though hematologic workup pursued.     Defer care moving forward to Dr. Pitts and Dr. Perea. Appointment made 1/2026 with  at [facility] for further follow-up.     John Panchal PA-C  07/15/25   4:12 PM          [1] No family history on file.    "

## 2025-07-15 NOTE — CONSULTS
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  Patient states his pain began on Saturday with a dull aching pain to his midline and progressed to a sharp pain with movement and radiated into his right lower quadrant.  Patient denies nausea and vomiting.  Patient states he has had diarrhea.  Denies any issues with urination.  Denies hematochezia. Colonoscopy in 2022 had polyps removed repeat in 5 years.     CTAP was obtained in the ED and showed findings concerning for ischemic ileitis and proximal colitis secondary to vascular congestion from thrombosed ileocolic vein, with circumferentially thickened wall present in the distal/terminal ileum, cecum and proximal ascending colon with the associated mesenteric haziness/stranding and small amount of free fluid in which general surgery was consulted for further workup/management.     Allergies:  RX Allergies[1]   PMHx:  Medical History[2]  PSHx:  Surgical History[3]  Social Hx:  Social History[4]  Family Hx:  Family History[5]    Review of Systems:   Review of Systems   Gastrointestinal:  Positive for abdominal pain and diarrhea. Negative for blood in stool.   All other systems reviewed and are  negative.    Physical Examination:    Visit Vitals  /60 (BP Location: Left arm, Patient Position: Lying)   Pulse 90   Temp 37.7 °C (99.9 °F) (Temporal)   Resp 18      Physical Exam  Vitals reviewed.   Constitutional:       Appearance: Normal appearance. He is overweight.   HENT:      Head: Normocephalic.      Nose: Nose normal.      Mouth/Throat:      Mouth: Mucous membranes are moist.   Cardiovascular:      Rate and Rhythm: Normal rate.   Pulmonary:      Effort: Pulmonary effort is normal.   Abdominal:      General: Abdomen is protuberant. Bowel sounds are normal.      Palpations: Abdomen is soft.      Tenderness: There is abdominal tenderness in the right lower quadrant and periumbilical area.   Skin:     General: Skin is warm.      Capillary Refill: Capillary refill takes less than 2 seconds.   Neurological:      General: No focal deficit present.      Mental Status: He is alert and oriented to person, place, and time.   Psychiatric:         Mood and Affect: Mood normal.       LABS:  CBC:   Lab Results   Component Value Date    WBC 22.9 (H) 07/15/2025    RBC 4.83 07/15/2025    HGB 13.9 07/15/2025    HCT 40.7 (L) 07/15/2025    MCV 84 07/15/2025    MCH 28.8 07/15/2025    MCHC 34.2 07/15/2025    RDW 13.0 07/15/2025     07/15/2025     CBC with Differential:    Lab Results   Component Value Date    WBC 22.9 (H) 07/15/2025    RBC 4.83 07/15/2025    HGB 13.9 07/15/2025    HCT 40.7 (L) 07/15/2025     07/15/2025    MCV 84 07/15/2025    MCH 28.8 07/15/2025    MCHC 34.2 07/15/2025    RDW 13.0 07/15/2025    NRBC 0.0 07/15/2025    LYMPHOPCT 5.1 07/14/2025    MONOPCT 7.8 07/14/2025    EOSPCT 0.2 07/14/2025    BASOPCT 0.3 07/14/2025    MONOSABS 2.23 (H) 07/14/2025    LYMPHSABS 1.46 07/14/2025    EOSABS 0.05 07/14/2025    BASOSABS 0.09 07/14/2025     CMP:    Lab Results   Component Value Date     (L) 07/15/2025    K 3.6 07/15/2025     07/15/2025    CO2 16 (L) 07/15/2025    BUN 38 (H) 07/15/2025     "CREATININE 1.55 (H) 07/15/2025    GLUCOSE 119 (H) 07/15/2025    PROT 7.7 07/14/2025    CALCIUM 7.9 (L) 07/15/2025    BILITOT 1.8 (H) 07/14/2025    ALKPHOS 84 07/14/2025    AST 12 07/14/2025    ALT 26 07/14/2025     BMP:    Lab Results   Component Value Date     (L) 07/15/2025    K 3.6 07/15/2025     07/15/2025    CO2 16 (L) 07/15/2025    BUN 38 (H) 07/15/2025    CREATININE 1.55 (H) 07/15/2025    CALCIUM 7.9 (L) 07/15/2025    GLUCOSE 119 (H) 07/15/2025     Magnesium:No results found for: \"MG\"  Troponin:    Lab Results   Component Value Date    TROPHS 7 07/14/2025     Lipid Panel:  No results found for: \"HDL\", \"CHHDL\", \"VLDL\", \"TRIG\", \"NHDL\"   Current Medications:  Current Medications[6]  ECG 12 lead  Result Date: 7/15/2025  Sinus rhythm with Premature supraventricular complexes and with frequent Premature ventricular complexes Otherwise normal ECG No previous ECGs available    CT abdomen pelvis w IV contrast  Result Date: 7/14/2025  Interpreted By:  Mark Rdz, STUDY: CT ABDOMEN PELVIS W IV CONTRAST;  7/14/2025 7:01 pm   INDICATION: Signs/Symptoms:periumbilical pain, suprapubic and right lower quadrant pain.     COMPARISON: None.   ACCESSION NUMBER(S): [id number]   ORDERING CLINICIAN: KATHARINE CAVAZOS   TECHNIQUE: Contiguous axial images of the abdomen and pelvis were obtained after the intravenous administration of 75 mL of Omnipaque 350 iodinated contrast. Coronal and sagittal reformatted images were reconstructed from the axial data.   FINDINGS: LOWER CHEST: Included lung bases are clear without consolidation or sizable effusion.   Heart is normal in size without pericardial effusion.   Distal esophagus is unremarkable in appearance.   ABDOMEN/PELVIS:   ABDOMINAL WALL: Cutaneous tissues of the abdomen and pelvis do not demonstrate any acute abnormality.   LIVER: No acute hepatic parenchymal abnormality is present. Liver demonstrates slightly decreased attenuation relative to the spleen " suggestive of fatty infiltration. Liver is also enlarged, measuring up to 22 cm in craniocaudal dimension.   Portal vein is unremarkable in appearance.   BILE DUCTS: No intrahepatic or extrahepatic biliary dilatation is present.   GALLBLADDER: No gallbladder wall thickening is noted.   PANCREAS: No pancreatic ductal dilatation or peripancreatic stranding is present.   SPLEEN: No acute splenic abnormality is present.   ADRENALS: Bilateral adrenal glands are unremarkable in appearance.   KIDNEYS, URETERS, BLADDER: Kidneys are symmetric in size and enhancement without evidence of hydronephrosis or radiopaque nephrolithiasis.   Upper ureters are unremarkable in appearance.   Bladder is decompressed and unremarkable.   REPRODUCTIVE ORGANS: Prostate is unremarkable in appearance.   VESSELS: No acute abnormality is identified within the abdominal aorta or larger arteries in the abdomen or pelvis.   There is somewhat indistinct appearance of the ileocolic vein in the right mid abdomen (series 201, image 81), with the vessel demonstrates slightly decreased attenuation relative to other venous structures in the abdomen, including of the superior mesenteric vein and mild adjacent fat stranding.   RETROPERITONEUM/LYMPH NODES: No acute retroperitoneal abnormality. No enlarged lymph nodes.   BOWEL/MESENTERY/PERITONEUM: Stomach is decompressed and unremarkable in appearance. No abnormal small bowel dilatation is present. There is circumferential wall thickening present in the distal and terminal ileum as well as in the cecum and proximal ascending colon, with some surrounding mesenteric haziness/stranding and small amount of free fluid in the right lower quadrant. There is no evidence of pneumatosis intestinalis.   More distal ascending, transverse and descending colon demonstrates some liquid stool but without evidence of the new wall thickening.   Numerous diverticula are present in the descending and sigmoid colon without  evidence of acute diverticulitis.   Appendix has small amount of the adjacent fluid but does not appear to be enlarged or demonstrate any wall thickening.   Small amount of fluid is present in the right lower quadrant of the abdomen. No free air or thick-walled collections are identified.     MUSCULOSKELETAL: No acute osseous abnormality in the visualized spine or pelvis. No suspicious osseous lesion.       1. Constellation of the imaging findings concerning for ischemic ileitis and proximal colitis secondary to vascular congestion from thrombosed ileocolic vein, with circumferentially thickened wall present in the distal/terminal ileum, cecum and proximal ascending colon with the associated mesenteric haziness/stranding and small amount of free fluid. There is no evidence of pneumatosis intestinalis at this time. 2. The ileocolic vein is somewhat indistinct in appearance and demonstrates slightly decreased attenuation relative to other venous structures in the abdomen and pelvis with some surrounding fat stranding, most suggestive of thrombosis and associated thrombophlebitis. 3. There is no wall thickening present in the more distal ascending, transverse or descending colon, although there is some intraluminal liquid stool, possibly representing component of enterocolitis. 4. Liver is enlarged, and demonstrates slightly decreased attenuation relative to the spleen suggestive of fatty infiltration. Correlate with serum LFTs. 5. Scattered diverticula are present throughout the descending and sigmoid colon without evidence of acute diverticulitis.   MACRO: Mark Rdz discussed the significance and urgency of this critical finding by EPIC secure chat with  KATHARINE CAVAZOS on 7/14/2025 at 7:27 pm.  (**-RCF-**) Findings:  See findings.   .   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  Patient states he does not have pain when just lying still but when he moves or turns it causes a sharp pain but then he goes back to no pain.  He states he is passing gas and had diarrhea this morning.    On exam abdomen is soft, protuberant, tenderness with palpation to suprapubic, midline, right lower quadrant.  Bowel sounds present x 4 quadrant.  No signs of peritonitis.  This morning show WBC 22.9 down from 28.8 on admission.  Afebrile.  Patient educated to let nursing know of increased abdominal pain, nausea, vomiting CTAP may need to be ordered sooner.      Plan:  -Clear liquid diet  -IVF  -Repeat CTAP tomorrow  -Pain control  -Nausea control  -DVT prophylaxis-heparin GTT  -Pulmonary toileting   -Encourage ambulation  -Continue care per primary team     Further recommendations per Dr. Pitts     Time spent  60  minutes obtaining labs, imaging, recommendations, interview, assessment, examination, medication review/ordering, and EMR review.    Plan of care was discussed extensively with patient. Patient verbalized understanding through teach back method. All questions and concerns addressed upon examination.     Of note, this documentation is completed using the Dragon Dictation system (voice recognition software). There may be spelling and/or grammatical errors that were not corrected prior to final submission.    Electronically signed by VIOLET Alfaro on 7/15/2025 at 7:53 AM          [1] No Known Allergies  [2]   Past Medical History:  Diagnosis Date    Personal history of other endocrine, nutritional and metabolic disease     History of hyperlipidemia   [3] No past surgical history on file.  [4]   Social History  Socioeconomic History    Marital status:    Tobacco Use    Smoking status: Former     Current packs/day: 0.00     Average packs/day: 1 pack/day for 10.0 years (10.0 ttl pk-yrs)     Types: Cigars, Cigarettes     Start date: 2000     Quit date: 2010     Years since  quitting: 15.5    Smokeless tobacco: Never   Substance and Sexual Activity    Alcohol use: Not Currently    Drug use: Never     Social Drivers of Health     Financial Resource Strain: Low Risk  (7/14/2025)    Overall Financial Resource Strain (CARDIA)     Difficulty of Paying Living Expenses: Not hard at all   Food Insecurity: No Food Insecurity (7/14/2025)    Hunger Vital Sign     Worried About Running Out of Food in the Last Year: Never true     Ran Out of Food in the Last Year: Never true   Transportation Needs: No Transportation Needs (7/14/2025)    PRAPARE - Transportation     Lack of Transportation (Medical): No     Lack of Transportation (Non-Medical): No   Intimate Partner Violence: Not At Risk (7/14/2025)    Humiliation, Afraid, Rape, and Kick questionnaire     Fear of Current or Ex-Partner: No     Emotionally Abused: No     Physically Abused: No     Sexually Abused: No   Housing Stability: Low Risk  (7/14/2025)    Housing Stability Vital Sign     Unable to Pay for Housing in the Last Year: No     Number of Times Moved in the Last Year: 0     Homeless in the Last Year: No   [5] No family history on file.  [6]   Current Facility-Administered Medications:     acetaminophen (Tylenol) tablet 650 mg, 650 mg, oral, q4h PRN **OR** acetaminophen (Tylenol) oral liquid 650 mg, 650 mg, oral, q4h PRN **OR** acetaminophen (Tylenol) suppository 650 mg, 650 mg, rectal, q4h PRN, VIOLET Santizo    heparin 25,000 Units in dextrose 5% 250 mL (100 Units/mL) infusion (premix), 0-4,500 Units/hr, intravenous, Continuous, Danielle Dobbs PA-C, Last Rate: 20 mL/hr at 07/14/25 2111, 2,000 Units/hr at 07/14/25 2111    heparin bolus from bag 3,000-6,000 Units, 3,000-6,000 Units, intravenous, q4h PRN, Danielle Dobbs PA-C    HYDROmorphone PF (Dilaudid) injection 0.2 mg, 0.2 mg, intravenous, q3h PRN, VIOLET Santizo, 0.2 mg at 07/15/25 0042    melatonin tablet 10 mg, 10 mg, oral, Nightly PRN, Humza  DIANE Hutson, APRN-CNP    piperacillin-tazobactam (Zosyn) 3.375 g in dextrose (iso) IV 50 mL, 3.375 g, intravenous, q8h, John Ann MD, Stopped at 07/15/25 0706    polyethylene glycol (Glycolax, Miralax) packet 17 g, 17 g, oral, Daily PRN, Humza Hutson, APRN-CNP

## 2025-07-15 NOTE — PROGRESS NOTES
Medical Group Progress Note  ASSESSMENT & PLAN:        Ileocolic thrombosis  Personal history of DVT  Long-term anticoagulation use  - Presenting from home with right lower quadrant pain  - Imaging with findings consistent with a thrombosed ileocolic vein  - Prior DVT with COVID in 2021, has been on apixaban since  - States that he has missed at least 4-5 doses in the evening this month  - Heparin drip  - Vascular surgery following  - Plan to resume apixaban on discharge  - States that he has not had a hypercoagulable workup in the past, did not see any in chart review  - Have ordered protein C, protein S, factor V Leiden, Antithrombin, cardiolipin, lupus anticoagulant to start process, PCP to follow-up on results    Ischemic ileitis and proximal colitis - insetting of above  - General Surgery following  - Will observe 24 hours  - Diet as tolerated  - Zosyn    Hyponatremia  - Likely in setting of hydrochlorothiazide use  - Awaiting urine studies, TSH  - Will discontinue hydrochlorothiazide    MESHA, resolved  - Creatinine 1.55 today (2.16 on admission, baseline 1.1)    Leukocytosis, improving  - WBC count 22.9 today (28.8 on admission)    Essential hypertension  - Will discontinue lisinopril hydrochlorothiazide combination pill and start lisinopril only    VTE Prophylaxis: Heparin drip    Disposition/Daily update: Sodium unchanged, discontinue hydrochlorothiazide and will work up hyponatremia.  Spoke with general surgery and vascular surgery today, plan for discharge home tomorrow.  Continue heparin drip for 24 more hours and transition back to apixaban, not deeming this failed therapy, as patient has missed multiple doses in the last 2 weeks we will consider this as noncompliance and continue medications.      ---Of note, this documentation is completed using the Dragon Dictation system (voice recognition software). There may be spelling and/or grammatical errors that were not corrected prior to final  submission.---    Mark Perea MD    SUBJECTIVE     Patient was seen bedside this morning.  Still having episodes of lower abdominal pain, did have diarrhea this morning.  States that he did miss probably 4-5 doses of Eliquis this month especially evening times.    OBJECTIVE:     Last Recorded Vitals:  Vitals:    07/14/25 2113 07/14/25 2237 07/14/25 2355 07/15/25 0814   BP: 101/59 123/66 118/60 125/70   BP Location: Right arm Left arm Left arm Left arm   Patient Position: Lying Sitting Lying Lying   Pulse: 92 94 90 89   Resp: 19 18 18 20   Temp:  37.7 °C (99.9 °F) 37.7 °C (99.9 °F) 36.1 °C (97 °F)   TempSrc:  Temporal Temporal Temporal   SpO2: 97% 96% 93% 94%   Weight:       Height:         Last I/O:  I/O last 3 completed shifts:  In: - (0 mL/kg)   Out: 300 (2.6 mL/kg) [Urine:300 (0.1 mL/kg/hr)]  Weight: 113.4 kg     Physical Exam  Vitals reviewed.   Constitutional:       General: He is not in acute distress.     Appearance: Normal appearance. He is not ill-appearing.   HENT:      Head: Normocephalic and atraumatic.   Eyes:      Extraocular Movements: Extraocular movements intact.      Conjunctiva/sclera: Conjunctivae normal.   Cardiovascular:      Rate and Rhythm: Normal rate and regular rhythm.      Pulses: Normal pulses.      Heart sounds: Normal heart sounds.   Pulmonary:      Effort: Pulmonary effort is normal.      Breath sounds: Normal breath sounds.   Abdominal:      General: Bowel sounds are normal. There is no distension.      Palpations: Abdomen is soft.      Tenderness: There is abdominal tenderness. There is no guarding.      Comments: Abdomen diffusely soft.  Bowel sounds present.  Lower abdominal tenderness to palpation.  No rebound or guarding.   Musculoskeletal:         General: No swelling or tenderness. Normal range of motion.      Cervical back: Normal range of motion and neck supple.   Neurological:      General: No focal deficit present.      Mental Status: He is alert and oriented to person,  place, and time. Mental status is at baseline.   Psychiatric:         Mood and Affect: Mood normal.         Behavior: Behavior normal.       Inpatient Medications:  Scheduled Medications[1]    PRN Medications  PRN Medications[2]    Continuous Medications:  Continuous Medications[3]  LABS AND IMAGING:     Labs:  Results from last 7 days   Lab Units 07/15/25  0322 07/14/25  1749   WBC AUTO x10*3/uL 22.9* 28.8*   RBC AUTO x10*6/uL 4.83 5.58   HEMOGLOBIN g/dL 13.9 16.0   HEMATOCRIT % 40.7* 47.2   MCV fL 84 85   MCH pg 28.8 28.7   MCHC g/dL 34.2 33.9   RDW % 13.0 13.1   PLATELETS AUTO x10*3/uL 305 372     Results from last 7 days   Lab Units 07/15/25  0322 07/14/25  1749   SODIUM mmol/L 127* 127*   POTASSIUM mmol/L 3.6 3.7   CHLORIDE mmol/L 101 96*   CO2 mmol/L 16* 18*   BUN mg/dL 38* 43*   CREATININE mg/dL 1.55* 2.16*   GLUCOSE mg/dL 119* 123*   PROTEIN TOTAL g/dL  --  7.7   CALCIUM mg/dL 7.9* 9.0   BILIRUBIN TOTAL mg/dL  --  1.8*   ALK PHOS U/L  --  84   AST U/L  --  12   ALT U/L  --  26         Results from last 7 days   Lab Units 07/14/25  1749   TROPHS ng/L 7     Imaging:  ECG 12 lead  Sinus rhythm with Premature supraventricular complexes and with frequent Premature ventricular complexes  Otherwise normal ECG  No previous ECGs available            [1] piperacillin-tazobactam, 3.375 g, intravenous, q8h    [2] PRN medications: acetaminophen **OR** acetaminophen **OR** acetaminophen, heparin, HYDROmorphone, melatonin, polyethylene glycol  [3] heparin, 0-4,500 Units/hr, Last Rate: 2,000 Units/hr (07/15/25 1050)  lactated Ringer's, 100 mL/hr

## 2025-07-16 ENCOUNTER — APPOINTMENT (OUTPATIENT)
Dept: RADIOLOGY | Facility: HOSPITAL | Age: 50
DRG: 394 | End: 2025-07-16
Payer: COMMERCIAL

## 2025-07-16 VITALS
HEART RATE: 87 BPM | OXYGEN SATURATION: 94 % | BODY MASS INDEX: 35.79 KG/M2 | WEIGHT: 250 LBS | SYSTOLIC BLOOD PRESSURE: 135 MMHG | DIASTOLIC BLOOD PRESSURE: 70 MMHG | RESPIRATION RATE: 18 BRPM | HEIGHT: 70 IN | TEMPERATURE: 97.5 F

## 2025-07-16 LAB
ANION GAP SERPL CALC-SCNC: 14 MMOL/L (ref 10–20)
AT III ACT/NOR PPP CHRO: 59 % ACTIVITY (ref 80–130)
BASOPHILS # BLD AUTO: 0.13 X10*3/UL (ref 0–0.1)
BASOPHILS NFR BLD AUTO: 0.7 %
BUN SERPL-MCNC: 30 MG/DL (ref 6–23)
CALCIUM SERPL-MCNC: 8.6 MG/DL (ref 8.6–10.3)
CARDIOLIPIN IGA SERPL-ACNC: 3.6 APL U/ML
CARDIOLIPIN IGG SER IA-ACNC: <1.6 GPL U/ML
CARDIOLIPIN IGM SER IA-ACNC: 4.5 MPL U/ML
CHLORIDE SERPL-SCNC: 105 MMOL/L (ref 98–107)
CO2 SERPL-SCNC: 17 MMOL/L (ref 21–32)
CREAT SERPL-MCNC: 1.48 MG/DL (ref 0.5–1.3)
EGFRCR SERPLBLD CKD-EPI 2021: 57 ML/MIN/1.73M*2
EOSINOPHIL # BLD AUTO: 0.43 X10*3/UL (ref 0–0.7)
EOSINOPHIL NFR BLD AUTO: 2.5 %
ERYTHROCYTE [DISTWIDTH] IN BLOOD BY AUTOMATED COUNT: 13.2 % (ref 11.5–14.5)
GLUCOSE SERPL-MCNC: 98 MG/DL (ref 74–99)
HCT VFR BLD AUTO: 42 % (ref 41–52)
HGB BLD-MCNC: 13.9 G/DL (ref 13.5–17.5)
HOLD SPECIMEN: NORMAL
HOLD SPECIMEN: NORMAL
IMM GRANULOCYTES # BLD AUTO: 0.71 X10*3/UL (ref 0–0.7)
IMM GRANULOCYTES NFR BLD AUTO: 4.1 % (ref 0–0.9)
LYMPHOCYTES # BLD AUTO: 1.88 X10*3/UL (ref 1.2–4.8)
LYMPHOCYTES NFR BLD AUTO: 10.8 %
MCH RBC QN AUTO: 28.2 PG (ref 26–34)
MCHC RBC AUTO-ENTMCNC: 33.1 G/DL (ref 32–36)
MCV RBC AUTO: 85 FL (ref 80–100)
MONOCYTES # BLD AUTO: 2.13 X10*3/UL (ref 0.1–1)
MONOCYTES NFR BLD AUTO: 12.3 %
NEUTROPHILS # BLD AUTO: 12.06 X10*3/UL (ref 1.2–7.7)
NEUTROPHILS NFR BLD AUTO: 69.6 %
NRBC BLD-RTO: 0 /100 WBCS (ref 0–0)
PLATELET # BLD AUTO: 352 X10*3/UL (ref 150–450)
POTASSIUM SERPL-SCNC: 3.9 MMOL/L (ref 3.5–5.3)
RBC # BLD AUTO: 4.93 X10*6/UL (ref 4.5–5.9)
SODIUM SERPL-SCNC: 132 MMOL/L (ref 136–145)
UFH PPP CHRO-ACNC: 0.1 IU/ML (ref ?–1.1)
UFH PPP CHRO-ACNC: 0.1 IU/ML (ref ?–1.1)
UFH PPP CHRO-ACNC: 1.3 IU/ML (ref ?–1.1)
WBC # BLD AUTO: 17.3 X10*3/UL (ref 4.4–11.3)

## 2025-07-16 PROCEDURE — 2500000001 HC RX 250 WO HCPCS SELF ADMINISTERED DRUGS (ALT 637 FOR MEDICARE OP): Performed by: STUDENT IN AN ORGANIZED HEALTH CARE EDUCATION/TRAINING PROGRAM

## 2025-07-16 PROCEDURE — 2550000001 HC RX 255 CONTRASTS: Performed by: STUDENT IN AN ORGANIZED HEALTH CARE EDUCATION/TRAINING PROGRAM

## 2025-07-16 PROCEDURE — 80048 BASIC METABOLIC PNL TOTAL CA: CPT | Performed by: STUDENT IN AN ORGANIZED HEALTH CARE EDUCATION/TRAINING PROGRAM

## 2025-07-16 PROCEDURE — 2500000004 HC RX 250 GENERAL PHARMACY W/ HCPCS (ALT 636 FOR OP/ED): Performed by: REGISTERED NURSE

## 2025-07-16 PROCEDURE — 85520 HEPARIN ASSAY: CPT | Performed by: STUDENT IN AN ORGANIZED HEALTH CARE EDUCATION/TRAINING PROGRAM

## 2025-07-16 PROCEDURE — 74174 CTA ABD&PLVS W/CONTRAST: CPT

## 2025-07-16 PROCEDURE — 99239 HOSP IP/OBS DSCHRG MGMT >30: CPT | Performed by: STUDENT IN AN ORGANIZED HEALTH CARE EDUCATION/TRAINING PROGRAM

## 2025-07-16 PROCEDURE — 85025 COMPLETE CBC W/AUTO DIFF WBC: CPT | Performed by: STUDENT IN AN ORGANIZED HEALTH CARE EDUCATION/TRAINING PROGRAM

## 2025-07-16 PROCEDURE — 36415 COLL VENOUS BLD VENIPUNCTURE: CPT | Performed by: STUDENT IN AN ORGANIZED HEALTH CARE EDUCATION/TRAINING PROGRAM

## 2025-07-16 PROCEDURE — 99232 SBSQ HOSP IP/OBS MODERATE 35: CPT | Performed by: REGISTERED NURSE

## 2025-07-16 PROCEDURE — 2500000004 HC RX 250 GENERAL PHARMACY W/ HCPCS (ALT 636 FOR OP/ED): Performed by: PHYSICIAN ASSISTANT

## 2025-07-16 PROCEDURE — 74174 CTA ABD&PLVS W/CONTRAST: CPT | Performed by: RADIOLOGY

## 2025-07-16 PROCEDURE — 2500000004 HC RX 250 GENERAL PHARMACY W/ HCPCS (ALT 636 FOR OP/ED): Performed by: STUDENT IN AN ORGANIZED HEALTH CARE EDUCATION/TRAINING PROGRAM

## 2025-07-16 RX ORDER — LISINOPRIL 20 MG/1
20 TABLET ORAL DAILY
Qty: 30 TABLET | Refills: 3 | Status: SHIPPED | OUTPATIENT
Start: 2025-07-16 | End: 2025-11-13

## 2025-07-16 RX ADMIN — HEPARIN SODIUM 2000 UNITS/HR: 10000 INJECTION, SOLUTION INTRAVENOUS at 00:30

## 2025-07-16 RX ADMIN — SODIUM CHLORIDE, POTASSIUM CHLORIDE, SODIUM LACTATE AND CALCIUM CHLORIDE 100 ML/HR: 600; 310; 30; 20 INJECTION, SOLUTION INTRAVENOUS at 00:34

## 2025-07-16 RX ADMIN — HEPARIN SODIUM 2300 UNITS/HR: 10000 INJECTION, SOLUTION INTRAVENOUS at 07:11

## 2025-07-16 RX ADMIN — SODIUM CHLORIDE, SODIUM LACTATE, POTASSIUM CHLORIDE, AND CALCIUM CHLORIDE 1000 ML: .6; .31; .03; .02 INJECTION, SOLUTION INTRAVENOUS at 08:06

## 2025-07-16 RX ADMIN — IOHEXOL 75 ML: 350 INJECTION, SOLUTION INTRAVENOUS at 08:49

## 2025-07-16 RX ADMIN — PIPERACILLIN SODIUM AND TAZOBACTAM SODIUM 3.38 G: 3; .375 INJECTION, SOLUTION INTRAVENOUS at 00:37

## 2025-07-16 RX ADMIN — PIPERACILLIN SODIUM AND TAZOBACTAM SODIUM 3.38 G: 3; .375 INJECTION, SOLUTION INTRAVENOUS at 08:06

## 2025-07-16 RX ADMIN — SODIUM CHLORIDE, SODIUM LACTATE, POTASSIUM CHLORIDE, AND CALCIUM CHLORIDE 1000 ML: .6; .31; .03; .02 INJECTION, SOLUTION INTRAVENOUS at 11:22

## 2025-07-16 RX ADMIN — SODIUM CHLORIDE, POTASSIUM CHLORIDE, SODIUM LACTATE AND CALCIUM CHLORIDE 100 ML/HR: 600; 310; 30; 20 INJECTION, SOLUTION INTRAVENOUS at 09:49

## 2025-07-16 RX ADMIN — LISINOPRIL 40 MG: 20 TABLET ORAL at 08:06

## 2025-07-16 RX ADMIN — HEPARIN SODIUM 2300 UNITS/HR: 10000 INJECTION, SOLUTION INTRAVENOUS at 09:49

## 2025-07-16 RX ADMIN — APIXABAN 10 MG: 5 TABLET, FILM COATED ORAL at 14:01

## 2025-07-16 ASSESSMENT — COGNITIVE AND FUNCTIONAL STATUS - GENERAL
MOBILITY SCORE: 24
DAILY ACTIVITIY SCORE: 24

## 2025-07-16 ASSESSMENT — PAIN - FUNCTIONAL ASSESSMENT: PAIN_FUNCTIONAL_ASSESSMENT: 0-10

## 2025-07-16 ASSESSMENT — PAIN SCALES - GENERAL: PAINLEVEL_OUTOF10: 0 - NO PAIN

## 2025-07-16 NOTE — DISCHARGE INSTRUCTIONS
Please call office to deanna a follow up appointment with Dr. Pitts in 1 week.    Continue a low fiber diet until follow up with Dr. Sayon     Call office or come to ER if:  You have a fever of 100.4 Fahrenheit  You cannot tolerate food or water  Unable to urinate  New or worsening of symptoms  Chest pain or shortness of breath

## 2025-07-16 NOTE — DISCHARGE SUMMARY
Medical Group Discharge Summary  DISCHARGE DIAGNOSIS     Ileocolic thrombosis  Personal history of DVT  Long-term anticoagulation use  Ischemic ileitis with proximal colitis  Hyponatremia, resolved  MESHA, resolved  Leukocytosis, improving     ISSUES REQUIRING FOLLOW-UP     PCP follow-up to discuss hypercoagulable workup and hospitalization  General Surgery follow-up posthospitalization  [facility] medicine follow-up to discuss anticoagulation    HOSPITAL COURSE AND DETAILS     This is a 50-year-old male with a significant past medical history of prior DVT on long-term anticoagulation with apixaban, hypertension that presented from home with chief complaints of abdominal pain for 2 to 3 days with diarrhea.    Patient was found to have an ileocolic vein thrombosis as well as of local associated ischemic ileitis and colitis.  Upon further examination, patient states that on a weekly basis he misses multiple doses of the evening apixaban.  Patient was started heparin infusion and seen by both vascular surgery and general surgery.  Repeat CT abdomen 7/16 showed overall improvement in the inflammatory changes with a patent SMV and portal vein.  General surgery recommended continuing anticoagulation.  Vascular surgery recommended continuing apixaban due to considering noncompliance more than failed outpatient therapy.    Patient with hyponatremia, sodium at 127.  Deemed to be in setting of hydrochlorothiazide use.  Hydrochlorothiazide discontinued and sodium improved to 132 today.  MESHA on admission with creatinine of 2.5 which improved with IV fluids.  Leukocytosis with a WBC count of 28,000 which improved during hospitalization.  CBC and BMP in 72 hours.    At this time we will however load patient with apixaban 10 mg twice daily for 7 days followed by 5 mg twice daily indefinitely.  Hypercoagulable workup initiated, will need to follow-up with PCP in the next 1 to 2 weeks to discuss results.  General surgery follow-up in 1  week to discuss hospitalization, vascular surgery made a referral for vascular medicine to follow-up with in 6 months.    Total time spent on discharge: >30min      ---Of note, this documentation is completed using the Dragon Dictation system (voice recognition software). There may be spelling and/or grammatical errors that were not corrected prior to final submission.---    Mark Perea MD    DISCHARGE PHYSICAL EXAM     Last Recorded Vitals:  Vitals:    07/15/25 1928 07/15/25 2317 07/16/25 0653 07/16/25 1442   BP: 128/69 123/70 124/69 135/70   BP Location: Left arm  Left arm    Patient Position: Lying  Lying    Pulse: 90 84 77 87   Resp: 20 19 18    Temp: 36.7 °C (98.1 °F) 36.6 °C (97.9 °F) 36.5 °C (97.7 °F) 36.4 °C (97.5 °F)   TempSrc: Temporal  Temporal    SpO2: 94% 95% 95% 94%   Weight:       Height:           Physical Exam  Vitals reviewed.   Constitutional:       General: He is not in acute distress.     Appearance: Normal appearance. He is not ill-appearing.   HENT:      Head: Normocephalic and atraumatic.     Eyes:      Extraocular Movements: Extraocular movements intact.      Conjunctiva/sclera: Conjunctivae normal.       Cardiovascular:      Rate and Rhythm: Normal rate and regular rhythm.      Pulses: Normal pulses.      Heart sounds: Normal heart sounds.   Pulmonary:      Effort: Pulmonary effort is normal.      Breath sounds: Normal breath sounds.   Abdominal:      General: Bowel sounds are normal. There is no distension.      Palpations: Abdomen is soft.      Tenderness: There is no abdominal tenderness. There is no guarding.      Comments: Abdomen diffusely soft.  Bowel sounds present.  Non-tender to palpation.  No rebound or guarding.     Musculoskeletal:         General: No swelling or tenderness. Normal range of motion.      Cervical back: Normal range of motion and neck supple.     Neurological:      General: No focal deficit present.      Mental Status: He is alert and oriented to person,  place, and time. Mental status is at baseline.     Psychiatric:         Mood and Affect: Mood normal.         Behavior: Behavior normal.         DISCHARGE MEDICATIONS        Your medication list        START taking these medications        Instructions Last Dose Given Next Dose Due   lisinopril 20 mg tablet      Take 1 tablet (20 mg) by mouth once daily.              CHANGE how you take these medications        Instructions Last Dose Given Next Dose Due   Eliquis 5 mg tablet  Generic drug: apixaban  What changed: Another medication with the same name was added. Make sure you understand how and when to take each.      Take 1 tablet (5 mg) by mouth 2 times a day.       apixaban 5 mg (74 tabs) tablet  Commonly known as: Eliquis  What changed: You were already taking a medication with the same name, and this prescription was added. Make sure you understand how and when to take each.      Take 2 tablets (10 mg) by mouth 2 times a day for 7 days, then take 1 tablet (5 mg) by mouth 2 times a day.              CONTINUE taking these medications        Instructions Last Dose Given Next Dose Due   Ozempic 0.25 mg or 0.5 mg (2 mg/3 mL) pen injector  Generic drug: semaglutide      INJECT 0.25 MG UNDER THE SKIN EVERY 7 DAYS FOR 4 DOESES. INCREASE TO 0.5 MG EVERY 7 DAYS THEREAFTER              STOP taking these medications      lisinopriL-hydrochlorothiazide 20-12.5 mg tablet                  Where to Get Your Medications        These medications were sent to [facility]/pharmacy #[id number] [facility], [place] - [address] [place] [address] AT [place] [id number]      Phone: [phone]   apixaban 5 mg (74 tabs) tablet  lisinopril 20 mg tablet           OUTPATIENT FOLLOW-UP     Future Appointments   Date Time Provider Department Center   9/29/2025 11:00 AM Shahid Werner MD [id number] [place]   1/13/2026  8:45 AM Ceci Mario MD [id number] [place]

## 2025-07-16 NOTE — CARE PLAN
The patient's goals for the shift include      The clinical goals for the shift include remain HDS      Problem: Pain - Adult  Goal: Verbalizes/displays adequate comfort level or baseline comfort level  Outcome: Progressing     Problem: Safety - Adult  Goal: Free from fall injury  Outcome: Progressing     Problem: Discharge Planning  Goal: Discharge to home or other facility with appropriate resources  Outcome: Progressing     Problem: Chronic Conditions and Co-morbidities  Goal: Patient's chronic conditions and co-morbidity symptoms are monitored and maintained or improved  Outcome: Progressing

## 2025-07-16 NOTE — CARE PLAN
The patient's goals for the shift include      The clinical goals for the shift include improve kidney function    Over the shift, the patient did make progress toward the following goals.    Problem: Pain - Adult  Goal: Verbalizes/displays adequate comfort level or baseline comfort level  Outcome: Progressing     Problem: Safety - Adult  Goal: Free from fall injury  Outcome: Progressing     Problem: Discharge Planning  Goal: Discharge to home or other facility with appropriate resources  Outcome: Progressing     Problem: Chronic Conditions and Co-morbidities  Goal: Patient's chronic conditions and co-morbidity symptoms are monitored and maintained or improved  Outcome: Progressing     Problem: Nutrition  Goal: Nutrient intake appropriate for maintaining nutritional needs  Outcome: Progressing

## 2025-07-16 NOTE — PROGRESS NOTES
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 No acute events overnight.  Patient states he is feeling much better today compared to yesterday.  He denies nausea and vomiting.  He states he has no abdominal pain even when moving.  He states he is passing gas and still having Bms that are diarrhea loss in nature.     PHYSICAL EXAM:  Physical Exam  Vitals reviewed.   Constitutional:       Appearance: Normal appearance.   HENT:      Head: Normocephalic.      Nose: Nose normal.      Mouth/Throat:      Mouth: Mucous membranes are moist.     Cardiovascular:      Rate and Rhythm: Normal rate.   Pulmonary:      Effort: Pulmonary effort is normal.   Abdominal:      General: Abdomen is protuberant. Bowel sounds are normal.      Palpations: Abdomen is soft.     Skin:     General: Skin is warm.      Capillary Refill: Capillary refill takes less than 2 seconds.     Neurological:      General: No focal deficit present.      Mental Status: He is alert and oriented to person, place, and time.     Psychiatric:         Mood and Affect: Mood normal.       Vital signs in last 24 hours:  Vitals:    07/16/25 0653   BP: 124/69   Pulse: 77   Resp: 18   Temp: 36.5 °C (97.7 °F)   SpO2: 95%     Intake/Output this shift:    Intake/Output Summary (Last 24 hours) at 7/16/2025 0740  Last data filed at 7/15/2025 1928  Gross per 24 hour   Intake 3315.34 ml   Output 250 ml   Net 3065.34 ml      Allergies:  Allergies[1]   Medications:  Scheduled medications  Scheduled Medications[2]  Continuous medications  Continuous  Medications[3]  PRN medications  PRN Medications[4]    Labs:  Results for orders placed or performed during the hospital encounter of 07/14/25 (from the past 24 hours)   Cortisol AM   Result Value Ref Range    Cortisol  A.M. 33.8 (H) 5.0 - 20.0 ug/dL   Transthoracic Echo Complete   Result Value Ref Range    AV mn grad 7 mmHg    AV pk audie 1.65 m/s    LV Biplane EF 64 %    LVOT diam 2.00 cm    MV E/A ratio 1.19     Tricuspid annular plane systolic excursion 2.3 cm    LA vol index A/L 32.4 ml/m2    LV EF 63 %    RV free wall pk S' 19.70 cm/s    LVIDd 5.10 cm    Aortic Valve Area by Continuity of Peak Velocity 2.48 cm2    AV pk grad 11 mmHg    Aortic Valve Area by Continuity of VTI 2.31 cm2    LV A4C EF 65.7    Heparin Assay   Result Value Ref Range    Heparin Unfractionated 0.1 See Comment Below for Therapeutic Ranges IU/mL   CBC and Auto Differential   Result Value Ref Range    WBC 17.3 (H) 4.4 - 11.3 x10*3/uL    nRBC 0.0 0.0 - 0.0 /100 WBCs    RBC 4.93 4.50 - 5.90 x10*6/uL    Hemoglobin 13.9 13.5 - 17.5 g/dL    Hematocrit 42.0 41.0 - 52.0 %    MCV 85 80 - 100 fL    MCH 28.2 26.0 - 34.0 pg    MCHC 33.1 32.0 - 36.0 g/dL    RDW 13.2 11.5 - 14.5 %    Platelets 352 150 - 450 x10*3/uL    Neutrophils % 69.6 40.0 - 80.0 %    Immature Granulocytes %, Automated 4.1 (H) 0.0 - 0.9 %    Lymphocytes % 10.8 13.0 - 44.0 %    Monocytes % 12.3 2.0 - 10.0 %    Eosinophils % 2.5 0.0 - 6.0 %    Basophils % 0.7 0.0 - 2.0 %    Neutrophils Absolute 12.06 (H) 1.20 - 7.70 x10*3/uL    Immature Granulocytes Absolute, Automated 0.71 (H) 0.00 - 0.70 x10*3/uL    Lymphocytes Absolute 1.88 1.20 - 4.80 x10*3/uL    Monocytes Absolute 2.13 (H) 0.10 - 1.00 x10*3/uL    Eosinophils Absolute 0.43 0.00 - 0.70 x10*3/uL    Basophils Absolute 0.13 (H) 0.00 - 0.10 x10*3/uL      Imaging:  Imaging  CT abdomen pelvis w IV contrast  Result Date: 7/14/2025  1. Constellation of the imaging findings concerning for ischemic ileitis and proximal colitis secondary to  vascular congestion from thrombosed ileocolic vein, with circumferentially thickened wall present in the distal/terminal ileum, cecum and proximal ascending colon with the associated mesenteric haziness/stranding and small amount of free fluid. There is no evidence of pneumatosis intestinalis at this time. 2. The ileocolic vein is somewhat indistinct in appearance and demonstrates slightly decreased attenuation relative to other venous structures in the abdomen and pelvis with some surrounding fat stranding, most suggestive of thrombosis and associated thrombophlebitis. 3. There is no wall thickening present in the more distal ascending, transverse or descending colon, although there is some intraluminal liquid stool, possibly representing component of enterocolitis. 4. Liver is enlarged, and demonstrates slightly decreased attenuation relative to the spleen suggestive of fatty infiltration. Correlate with serum LFTs. 5. Scattered diverticula are present throughout the descending and sigmoid colon without evidence of acute diverticulitis.   MACRO: Mark Rdz discussed the significance and urgency of this critical finding by EPIC secure chat with  KATHARINE CAVAZOS on 7/14/2025 at 7:27 pm.  (**-RCF-**) Findings:  See findings.   .   Signed by: Mark Rdz 7/14/2025 7:28 PM Dictation workstation:   [id number]      Cardiology, Vascular, and Other Imaging  Transthoracic Echo Complete  Result Date: 7/15/2025          [place] [address]  Tel [phone] Fax [phone] TRANSTHORACIC ECHOCARDIOGRAM REPORT Patient Name:       GASPER Samuels Physician:    [id number] Gopal Michael MD, [facility] Study Date:         7/15/2025           Ordering Provider:    [id number] GARSIA MRN/PID:            [id number]            Fellow: Accession#:          [id number]        Nurse:                Eduard Oseguera RN Date of Birth/Age:  1975 / 50      Sonographer:          Sil wise RDCS Gender Assigned at  M                   Additional Staff: Birth: Height:             177.80 cm           Admit Date:           7/14/2025 Weight:             113.40 kg           Admission Status:     Inpatient -                                                               Routine BSA / BMI:          2.29 m2 / 35.87     Department Location:  [place] [address]                                     Echo Lab Blood Pressure: 101 /59 mmHg Study Type:    TRANSTHORACIC ECHO (TTE) COMPLETE Diagnosis/ICD: Acute embolism and thrombosis of other thoracic veins-I82.290;                Personal history of other venous thrombosis and embolism-Z86.18 Indication:    Acute Occlusion of Mesenteric Vein, Hx Portal Vein Thrombosis,                Anticoagulation Therapy CPT Codes:     Echo Complete w Full [id number] Patient History: Pertinent History: HTN, Anticoagulation and Hx DVT, Acute Occlusion of                    Mesenteric Vein, Abdominal Pain, Hx Portal Vein Thrombosis,                    DVT with unknown pathology. Study Detail: The following Echo studies were performed: 2D, M-Mode, Doppler and               color flow. Agitated saline used as a contrast agent for               intraseptal flow evaluation.  PHYSICIAN INTERPRETATION: Left Ventricle: The left ventricular systolic function is normal with a visually estimated ejection fraction of 60-65%. There are no regional wall motion abnormalities. The left ventricular cavity size is normal. There is mild increased septal and mildly increased posterior left ventricular wall thickness. There is left ventricular concentric remodeling. Spectral Doppler shows a Grade II (pseudonormal pattern) of left ventricular diastolic filling with an elevated  left atrial pressure. Left Atrium: The left atrial size is mildly dilated. A bubble study using agitated saline was performed. Bubble study is negative. Left atrial volume index is 38.3 ml/m2. Saline bubble contrast study is negative. Right Ventricle: The right ventricle is normal in size. There is normal right ventricular global systolic function. Right Atrium: The right atrial size is normal. Aortic Valve: The aortic valve is trileaflet. The aortic valve area by VTI is 2.31 cmï¿½ with a peak velocity of 1.65 m/s. The peak and mean gradients are 11 mmHg and 7 mmHg, respectively, with a dimensionless index of 0.73. There is no evidence of aortic valve regurgitation. Mitral Valve: The mitral valve is normal in structure. There is trace mitral valve regurgitation. The E Vmax is 0.98 m/s. Tricuspid Valve: The tricuspid valve is structurally normal. No evidence of tricuspid regurgitation. Pulmonic Valve: The pulmonic valve is structurally normal. There is no indication of pulmonic valve regurgitation. Pericardium: No pericardial effusion noted. Aorta: The aortic root is normal. Systemic Veins: The inferior vena cava appears mildly dilated, with IVC inspiratory collapse greater than 50%. In comparison to the previous echocardiogram(s): There are no prior studies on this patient for comparison purposes. No previous available for comparison.  CONCLUSIONS:  1. The left ventricular systolic function is normal with a visually estimated ejection fraction of 60-65%.  2. No regional wall motion abnormalities.  3. Spectral Doppler shows a Grade II (pseudonormal pattern) of left ventricular diastolic filling with an elevated left atrial pressure.  4. There is normal right ventricular global systolic function.  5. Left atrial volume index is 38.3 ml/m2.     Saline bubble contrast study is negative.  6. Trace mitral valve regurgitation.  7. The inferior vena cava appears mildly dilated, with IVC inspiratory collapse greater than  50%.  8. No previous available for comparison. QUANTITATIVE DATA SUMMARY:  2D MEASUREMENTS:             Normal Ranges: LAs:             4.30 cm     (2.7-4.0cm) IVSd:            1.10 cm     (0.6-1.1cm) LVPWd:           1.10 cm     (0.6-1.1cm) LVIDd:           5.10 cm     (3.9-5.9cm) LVIDs:           2.30 cm LV Mass Index:   93.2 g/m2 LVEDV Index:     58.04 ml/m2 LV % FS          54.9 %  LEFT ATRIUM:                  Normal Ranges: LA Vol A4C:        88.0 ml    (22+/-6mL/m2) LA Vol A2C:        60.5 ml LA Vol BP:         74.3 ml LA Vol Index A4C:  38.3ml/m2 LA Vol Index A2C:  26.3 ml/m2 LA Vol Index BP:   32.4 ml/m2 LA Area A4C:       23.2 cm2 LA Area A2C:       19.6 cm2 LA Major Axis A4C: 5.2 cm LA Major Axis A2C: 5.4 cm LA Volume Index:   29.3 ml/m2 LA Vol A4C:        77.4 ml LA Vol A2C:        57.1 ml LA Vol Index BSA:  29.3 ml/m2  RIGHT ATRIUM:                 Normal Ranges: RA Vol A4C:        32.3 ml    (8.3-19.5ml) RA Vol Index A4C:  14.1 ml/m2 RA Area A4C:       13.5 cm2 RA Major Axis A4C: 4.8 cm  AORTA MEASUREMENTS:         Normal Ranges: Asc Ao, d:          3.40 cm (2.1-3.4cm)  LV SYSTOLIC FUNCTION:                      Normal Ranges: EF-A4C View:    66 % (>=55%) EF-A2C View:    63 % EF-Biplane:     64 % EF-Visual:      63 % LV EF Reported: 63 %  LV DIASTOLIC FUNCTION:           Normal Ranges: MV Peak E:             0.98 m/s  (0.7-1.2 m/s) MV Peak A:             0.83 m/s  (0.42-0.7 m/s) E/A Ratio:             1.19      (1.0-2.2) MV e'                  0.084 m/s (>8.0) MV lateral e'          0.09 m/s MV medial e'           0.08 m/s E/e' Ratio:            11.76     (<8.0)  MITRAL VALVE:          Normal Ranges: MV DT:        212 msec (150-240msec)  AORTIC VALVE:                      Normal Ranges: AoV Vmax:                1.65 m/s  (<=1.7m/s) AoV Peak PG:             10.9 mmHg (<20mmHg) AoV Mean P.0 mmHg  (1.7-11.5mmHg) LVOT Max Hema:            1.30 m/s  (<=1.1m/s) AoV VTI:                 31.60  cm  (18-25cm) LVOT VTI:                23.20 cm LVOT Diameter:           2.00 cm   (1.8-2.4cm) AoV Area, VTI:           2.31 cm2  (2.5-5.5cm2) AoV Area,Vmax:           2.48 cm2  (2.5-4.5cm2) AoV Dimensionless Index: 0.73  RIGHT VENTRICLE: RV Basal 2.70 cm RV Mid   2.80 cm RV Major 6.4 cm TAPSE:   23.0 mm RV s'    0.20 m/s  TRICUSPID VALVE/RVSP:         Normal Ranges: Est. RA Pressure:     3 mmHg IVC Diam:             2.10 cm  PULMONIC VALVE:          Normal Ranges: PV Accel Time:  111 msec (>120ms) PV Max Hema:     1.5 m/s  (0.6-0.9m/s) PV Max P.2 mmHg  [id number] Gopal Michael MD, FACC Electronically signed on 7/15/2025 at 3:29:35 PM  ** Final **     ECG 12 lead  Result Date: 7/15/2025  Sinus rhythm with Premature supraventricular complexes and with frequent Premature ventricular complexes Otherwise normal ECG No previous ECGs available       Assessment  Thrombus in ileocolic vein     On exam abdomen is soft, protuberant, nontender with palpation.  Bowel sounds present x 4 quadrant.  This morning show WBC 17.3 down from 22.9 yesterday.  Creatinine 1.48 down from 1.55 yesterday.  Afebrile.      Plan  -CT angio of abdomen and pelvis  -Advance diet pending CT angio  -2 L bolus  -Continue IV ABX  -Pain control  -Nausea control  -DVT prophylaxis-heparin GTT  -Pulmonary toileting   -Encourage ambulation  -AM labs tomorrow  -Continue care per primary team       Further recommendations per Dr. Pitts    Addendum  CT angio of abdomen pelvis showed the inflammatory changes along the course of the ileocolic vein has decreased. Persistent visualization what appeared to be a filling defect within the vein, noted toward the level of the SMV and portal veins appears to be patent. Minimal reactive enhancement is noted involving multiple segment of the ileum the appearance remain nonspecific and could be seen with any type of ileitis including ischemic ileitis. No additional secondary signs suggestive of bowel ischemia are  noted.      Plan   -Okay to advance to a low fiber diet  -Will speak with Dr. Pitts if okay to transition back to PO eliquis.      SUNITA Alfaro-CNP    Time spent  37  minutes obtaining labs, imaging, recommendations, interview, assessment, examination, medication review/ordering, and EMR review.    Plan of care was discussed extensively with patient. Patient verbalized understanding through teach back method. All questions and concerns addressed upon examination.     Of note, this documentation is completed using the Dragon Dictation system (voice recognition software). There may be spelling and/or grammatical errors that were not corrected prior to final submission.       [1] No Known Allergies  [2] lisinopril, 40 mg, oral, Daily  piperacillin-tazobactam, 3.375 g, intravenous, q8h  [3] heparin, 0-4,500 Units/hr, Last Rate: 2,300 Units/hr (07/16/25 0711)  lactated Ringer's, 100 mL/hr, Last Rate: 100 mL/hr (07/16/25 0034)  [4] PRN medications: acetaminophen **OR** acetaminophen **OR** acetaminophen, heparin, HYDROmorphone, melatonin, polyethylene glycol

## 2025-07-17 LAB
DRVVT SCREEN TO CONFIRM RATIO: 1.8 RATIO
DRVVT/DRVVT CFM NRMLZD PPP-RTO: 1.46 RATIO
DRVVT/DRVVT CFM P DOAC NEUT NORM PPP-RTO: 1.24 RATIO
LA 2 SCREEN W REFLEX-IMP: ABNORMAL
NORMALIZED SCT PPP-RTO: 0.85 RATIO
SILICA CLOTTING TIME CONFIRMATION: 0.77 RATIO
SILICA CLOTTING TIME SCREEN: 0.66 RATIO

## 2025-07-18 DIAGNOSIS — D72.829 LEUKOCYTOSIS, UNSPECIFIED TYPE: ICD-10-CM

## 2025-07-18 DIAGNOSIS — E87.1 HYPONATREMIA: ICD-10-CM

## 2025-07-18 LAB
PROT C AG ACT/NOR PPP IA: 81 % (ref 63–153)
PROT S AG ACT/NOR PPP IA: 123 % (ref 84–134)

## 2025-07-19 LAB
ANION GAP SERPL CALCULATED.4IONS-SCNC: 8 MMOL/L (CALC) (ref 7–17)
BACTERIA BLD CULT: NORMAL
BACTERIA BLD CULT: NORMAL
BASOPHILS # BLD AUTO: 76 CELLS/UL (ref 0–200)
BASOPHILS NFR BLD AUTO: 0.6 %
BUN SERPL-MCNC: 20 MG/DL (ref 7–25)
BUN/CREAT SERPL: NORMAL (CALC) (ref 6–22)
CALCIUM SERPL-MCNC: 8.7 MG/DL (ref 8.6–10.3)
CHLORIDE SERPL-SCNC: 107 MMOL/L (ref 98–110)
CO2 SERPL-SCNC: 23 MMOL/L (ref 20–32)
CREAT SERPL-MCNC: 1.01 MG/DL (ref 0.7–1.3)
EGFRCR SERPLBLD CKD-EPI 2021: 91 ML/MIN/1.73M2
EOSINOPHIL # BLD AUTO: 279 CELLS/UL (ref 15–500)
EOSINOPHIL NFR BLD AUTO: 2.2 %
ERYTHROCYTE [DISTWIDTH] IN BLOOD BY AUTOMATED COUNT: 12.3 % (ref 11–15)
GLUCOSE SERPL-MCNC: 92 MG/DL (ref 65–99)
HCT VFR BLD AUTO: 41.9 % (ref 38.5–50)
HGB BLD-MCNC: 13.6 G/DL (ref 13.2–17.1)
LYMPHOCYTES # BLD AUTO: 2083 CELLS/UL (ref 850–3900)
LYMPHOCYTES NFR BLD AUTO: 16.4 %
MCH RBC QN AUTO: 28.4 PG (ref 27–33)
MCHC RBC AUTO-ENTMCNC: 32.5 G/DL (ref 32–36)
MCV RBC AUTO: 87.5 FL (ref 80–100)
MONOCYTES # BLD AUTO: 1549 CELLS/UL (ref 200–950)
MONOCYTES NFR BLD AUTO: 12.2 %
NEUTROPHILS # BLD AUTO: 8712 CELLS/UL (ref 1500–7800)
NEUTROPHILS NFR BLD AUTO: 68.6 %
PLATELET # BLD AUTO: 417 THOUSAND/UL (ref 140–400)
PMV BLD REES-ECKER: 10.3 FL (ref 7.5–12.5)
POTASSIUM SERPL-SCNC: 4.6 MMOL/L (ref 3.5–5.3)
RBC # BLD AUTO: 4.79 MILLION/UL (ref 4.2–5.8)
SERVICE CMNT-IMP: ABNORMAL
SODIUM SERPL-SCNC: 138 MMOL/L (ref 135–146)
WBC # BLD AUTO: 12.7 THOUSAND/UL (ref 3.8–10.8)

## 2025-07-21 LAB
ELECTRONICALLY SIGNED BY: NORMAL
FACTOR V LEIDEN INTERPRETATION: NORMAL
FACTOR V LEIDEN RESULT: NORMAL

## 2025-07-24 ENCOUNTER — APPOINTMENT (OUTPATIENT)
Dept: SURGERY | Facility: CLINIC | Age: 50
End: 2025-07-24
Payer: COMMERCIAL

## 2025-07-24 VITALS — WEIGHT: 245 LBS | HEIGHT: 70 IN | BODY MASS INDEX: 35.07 KG/M2

## 2025-07-24 DIAGNOSIS — K55.019: Primary | ICD-10-CM

## 2025-07-24 PROCEDURE — 3008F BODY MASS INDEX DOCD: CPT | Performed by: SURGERY

## 2025-07-24 PROCEDURE — 99213 OFFICE O/P EST LOW 20 MIN: CPT | Performed by: SURGERY

## 2025-07-24 NOTE — PROGRESS NOTES
Subjective   Patient ID: Tien Prakash is a 50 y.o. male who presents for Hospital Follow-up.  HPI  50-year-old male patient who was in the hospital from July 14 to 16 and found to have occlusion of the ileocolic vein.  He was on heparin and then later transitioned back to his Eliquis.  Last CT angio abd/pelvis on July 16 showed improvement in inflammatory changes around TI/colon but persistent filling defect in vein.  Denies abdominal pain, nausea and vomiting. He reports some bright red blood in the commode and on the tissue when he wiped.  He is on Eliquis 5 mg twice daily.      Objective   Physical Exam  Gen: no acute distress  CV: RRR  Pulm: CTAB  Abd: soft, non-distended, non-tender  Assessment/Plan   The patient continues to do well clinically.  I encouraged patient to continue the Eliquis and not to skip any dose.  He will follow-up in 1 month during that time a repeat CT angio abdomen/pelvis will be done for evaluation of the clot; he will return to office early if any questions          Alberto Pitts MD 07/24/25 11:26 AM

## 2025-07-28 ENCOUNTER — APPOINTMENT (OUTPATIENT)
Dept: PRIMARY CARE | Facility: CLINIC | Age: 50
End: 2025-07-28
Payer: COMMERCIAL

## 2025-07-28 VITALS
HEART RATE: 84 BPM | BODY MASS INDEX: 35.07 KG/M2 | SYSTOLIC BLOOD PRESSURE: 121 MMHG | HEIGHT: 70 IN | DIASTOLIC BLOOD PRESSURE: 83 MMHG | WEIGHT: 245 LBS | TEMPERATURE: 97.9 F | RESPIRATION RATE: 18 BRPM

## 2025-07-28 DIAGNOSIS — K55.019: ICD-10-CM

## 2025-07-28 DIAGNOSIS — Z86.718 HX OF DEEP VENOUS THROMBOSIS: ICD-10-CM

## 2025-07-28 DIAGNOSIS — I10 HTN (HYPERTENSION), BENIGN: ICD-10-CM

## 2025-07-28 PROCEDURE — 3074F SYST BP LT 130 MM HG: CPT | Performed by: FAMILY MEDICINE

## 2025-07-28 PROCEDURE — 3008F BODY MASS INDEX DOCD: CPT | Performed by: FAMILY MEDICINE

## 2025-07-28 PROCEDURE — 99214 OFFICE O/P EST MOD 30 MIN: CPT | Performed by: FAMILY MEDICINE

## 2025-07-28 PROCEDURE — 3079F DIAST BP 80-89 MM HG: CPT | Performed by: FAMILY MEDICINE

## 2025-07-28 RX ORDER — LISINOPRIL 20 MG/1
20 TABLET ORAL DAILY
Qty: 90 TABLET | Refills: 3 | Status: SHIPPED | OUTPATIENT
Start: 2025-07-28

## 2025-07-28 ASSESSMENT — PATIENT HEALTH QUESTIONNAIRE - PHQ9
2. FEELING DOWN, DEPRESSED OR HOPELESS: NOT AT ALL
SUM OF ALL RESPONSES TO PHQ9 QUESTIONS 1 AND 2: 0
1. LITTLE INTEREST OR PLEASURE IN DOING THINGS: NOT AT ALL

## 2025-07-28 NOTE — PROGRESS NOTES
Subjective   Tien Prakash is a 50 y.o. male who presents for Hospital Follow-up.     HPI         Discharge Facility [facility]  Discharge Diagnosis: Ileocolic thrombosis, Personal history of DVT, Long-term anticoagulation use, Ischemic ileitis with proximal colitis, Hyponatremia, MESHA, Leukocytosis  Admission Date: 7/14/25  Discharge Date: 7/16/25   This patient is taking long-term anticoagulation with Eliquis due to a large unprovoked DVT.  Unfortunately he had difficulty remembering to take the second dose and subsequently developed an acute mesenteric thrombus resulting in acute ischemic colitis for which she was hospitalized.  He received heparin which gave him relief and had a loading dose of Eliquis and now has complete resolution of his symptoms.  He is reporting normal bowel function, no fever, no bleeding.  He had hyponatremia in the hospital and his thiazide diuretic was discontinued.  He has had a very large amount of weight loss over the last 2 years  Review of Systems   Visit Vitals  /83   Pulse 84   Temp 36.6 °C (97.9 °F)   Resp 18      Objective   Physical Exam  Constitutional:       Appearance: Normal appearance.   HENT:      Head: Normocephalic.     Eyes:      Conjunctiva/sclera: Conjunctivae normal.       Cardiovascular:      Rate and Rhythm: Normal rate and regular rhythm.      Heart sounds: Normal heart sounds.   Pulmonary:      Effort: Pulmonary effort is normal.      Breath sounds: Normal breath sounds.   Abdominal:      General: Abdomen is flat.      Palpations: Abdomen is soft.     Musculoskeletal:      Cervical back: Neck supple.     Skin:     General: Skin is warm and dry.     Neurological:      Mental Status: He is alert.       No data recorded     No data recorded   Patient Health Questionnaire-2 Score: 0 (7/28/2025  3:54 PM)   Assessment & Plan  Acute occlusion of mesenteric vein (Multi)  Hx of deep venous thrombosis  This 50-year-old male with hypercoagulable state  had some intermittent dosing of Eliquis and subsequently developed a mesenteric thrombus and subsequent ischemic colitis.  He fortunately responded very well to IV heparin and loading dose Eliquis and his symptoms have completely resolved.  He plans to follow-up with surgeon to repeat imaging to ensure that there is no long-term damage to the colon but clinically he seems to be resolved    Orders:    apixaban (Eliquis) 5 mg tablet; Take 1 tablet (5 mg) by mouth 2 times a day.    HTN (hypertension), benign  Over the last 2 years he has lost a significant amount of weight bringing his body mass index down to 35.  He was noted to be hyponatremic in the hospital and so his thiazide diuretic was discontinued but he continues taking lisinopril.  His blood pressure is well within goal limits today so it appears that the weight loss has reduced his hypertension & lisinopril is sufficient currently.  Orders:    lisinopril 20 mg tablet; Take 1 tablet (20 mg) by mouth once daily.           Please excuse any errors in grammar or translation related to this dictation. Voice recognition software was utilized to prepare this document.

## 2025-07-28 NOTE — ASSESSMENT & PLAN NOTE
Over the last 2 years he has lost a significant amount of weight bringing his body mass index down to 35.  He was noted to be hyponatremic in the hospital and so his thiazide diuretic was discontinued but he continues taking lisinopril.  His blood pressure is well within goal limits today so it appears that the weight loss has reduced his hypertension & lisinopril is sufficient currently.  Orders:    lisinopril 20 mg tablet; Take 1 tablet (20 mg) by mouth once daily.

## 2025-07-28 NOTE — ASSESSMENT & PLAN NOTE
This 50-year-old male with hypercoagulable state had some intermittent dosing of Eliquis and subsequently developed a mesenteric thrombus and subsequent ischemic colitis.  He fortunately responded very well to IV heparin and loading dose Eliquis and his symptoms have completely resolved.  He plans to follow-up with surgeon to repeat imaging to ensure that there is no long-term damage to the colon but clinically he seems to be resolved    Orders:    apixaban (Eliquis) 5 mg tablet; Take 1 tablet (5 mg) by mouth 2 times a day.

## 2025-07-30 LAB
ATRIAL RATE: 99 BPM
P AXIS: 40 DEGREES
P OFFSET: 189 MS
P ONSET: 145 MS
PR INTERVAL: 142 MS
Q ONSET: 216 MS
QRS COUNT: 17 BEATS
QRS DURATION: 84 MS
QT INTERVAL: 346 MS
QTC CALCULATION(BAZETT): 444 MS
QTC FREDERICIA: 408 MS
R AXIS: 65 DEGREES
T AXIS: 51 DEGREES
T OFFSET: 389 MS
VENTRICULAR RATE: 99 BPM

## 2025-08-21 ENCOUNTER — APPOINTMENT (OUTPATIENT)
Dept: SURGERY | Facility: CLINIC | Age: 50
End: 2025-08-21
Payer: COMMERCIAL

## 2025-08-21 VITALS
SYSTOLIC BLOOD PRESSURE: 130 MMHG | HEIGHT: 70 IN | WEIGHT: 250 LBS | DIASTOLIC BLOOD PRESSURE: 86 MMHG | BODY MASS INDEX: 35.79 KG/M2

## 2025-08-21 DIAGNOSIS — K55.9 MESENTERIC ISCHEMIA (MULTI): Primary | ICD-10-CM

## 2025-08-21 PROCEDURE — 99214 OFFICE O/P EST MOD 30 MIN: CPT | Performed by: SURGERY

## 2025-08-21 PROCEDURE — 3079F DIAST BP 80-89 MM HG: CPT | Performed by: SURGERY

## 2025-08-21 PROCEDURE — 3008F BODY MASS INDEX DOCD: CPT | Performed by: SURGERY

## 2025-08-21 PROCEDURE — 3075F SYST BP GE 130 - 139MM HG: CPT | Performed by: SURGERY

## 2025-08-22 LAB
CREAT SERPL-MCNC: 1.13 MG/DL (ref 0.7–1.3)
EGFRCR SERPLBLD CKD-EPI 2021: 79 ML/MIN/1.73M2

## 2025-09-05 ENCOUNTER — HOSPITAL ENCOUNTER (OUTPATIENT)
Dept: RADIOLOGY | Facility: HOSPITAL | Age: 50
Discharge: HOME | End: 2025-09-05
Payer: COMMERCIAL

## 2025-09-05 DIAGNOSIS — K55.9 MESENTERIC ISCHEMIA (MULTI): ICD-10-CM

## 2025-09-05 PROCEDURE — 2550000001 HC RX 255 CONTRASTS: Performed by: SURGERY

## 2025-09-05 PROCEDURE — 74174 CTA ABD&PLVS W/CONTRAST: CPT

## 2025-09-05 PROCEDURE — 74174 CTA ABD&PLVS W/CONTRAST: CPT | Performed by: RADIOLOGY

## 2025-09-05 RX ADMIN — IOHEXOL 75 ML: 350 INJECTION, SOLUTION INTRAVENOUS at 08:30

## 2025-09-29 ENCOUNTER — APPOINTMENT (OUTPATIENT)
Dept: PRIMARY CARE | Facility: CLINIC | Age: 50
End: 2025-09-29
Payer: COMMERCIAL